# Patient Record
Sex: MALE | Race: WHITE | NOT HISPANIC OR LATINO | Employment: FULL TIME | URBAN - METROPOLITAN AREA
[De-identification: names, ages, dates, MRNs, and addresses within clinical notes are randomized per-mention and may not be internally consistent; named-entity substitution may affect disease eponyms.]

---

## 2019-05-11 ENCOUNTER — HOSPITAL ENCOUNTER (EMERGENCY)
Facility: MEDICAL CENTER | Age: 34
End: 2019-05-11
Attending: EMERGENCY MEDICINE
Payer: COMMERCIAL

## 2019-05-11 VITALS
DIASTOLIC BLOOD PRESSURE: 83 MMHG | HEART RATE: 57 BPM | RESPIRATION RATE: 18 BRPM | TEMPERATURE: 98 F | OXYGEN SATURATION: 96 % | WEIGHT: 240.3 LBS | SYSTOLIC BLOOD PRESSURE: 137 MMHG | BODY MASS INDEX: 29.26 KG/M2 | HEIGHT: 76 IN
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
## 2019-05-11 PROCEDURE — A9270 NON-COVERED ITEM OR SERVICE: HCPCS

## 2019-05-11 PROCEDURE — 700102 HCHG RX REV CODE 250 W/ 637 OVERRIDE(OP)

## 2019-05-11 PROCEDURE — 700111 HCHG RX REV CODE 636 W/ 250 OVERRIDE (IP): Performed by: EMERGENCY MEDICINE

## 2019-05-11 PROCEDURE — 99284 EMERGENCY DEPT VISIT MOD MDM: CPT

## 2019-05-11 PROCEDURE — 90471 IMMUNIZATION ADMIN: CPT

## 2019-05-11 PROCEDURE — 90715 TDAP VACCINE 7 YRS/> IM: CPT | Performed by: EMERGENCY MEDICINE

## 2019-05-11 RX ORDER — AMOXICILLIN AND CLAVULANATE POTASSIUM 875; 125 MG/1; MG/1
1 TABLET, FILM COATED ORAL ONCE
Status: COMPLETED | OUTPATIENT
Start: 2019-05-11 | End: 2019-05-11

## 2019-05-11 RX ORDER — AMOXICILLIN AND CLAVULANATE POTASSIUM 875; 125 MG/1; MG/1
TABLET, FILM COATED ORAL
Status: COMPLETED
Start: 2019-05-11 | End: 2019-05-11

## 2019-05-11 RX ORDER — AMOXICILLIN AND CLAVULANATE POTASSIUM 875; 125 MG/1; MG/1
1 TABLET, FILM COATED ORAL 2 TIMES DAILY
Qty: 20 TAB | Refills: 0 | Status: SHIPPED | OUTPATIENT
Start: 2019-05-11 | End: 2019-05-21

## 2019-05-11 RX ADMIN — AMOXICILLIN AND CLAVULANATE POTASSIUM 1 TABLET: 875; 125 TABLET, FILM COATED ORAL at 10:52

## 2019-05-11 RX ADMIN — CLOSTRIDIUM TETANI TOXOID ANTIGEN (FORMALDEHYDE INACTIVATED), CORYNEBACTERIUM DIPHTHERIAE TOXOID ANTIGEN (FORMALDEHYDE INACTIVATED), BORDETELLA PERTUSSIS TOXOID ANTIGEN (GLUTARALDEHYDE INACTIVATED), BORDETELLA PERTUSSIS FILAMENTOUS HEMAGGLUTININ ANTIGEN (FORMALDEHYDE INACTIVATED), BORDETELLA PERTUSSIS PERTACTIN ANTIGEN, AND BORDETELLA PERTUSSIS FIMBRIAE 2/3 ANTIGEN 0.5 ML: 5; 2; 2.5; 5; 3; 5 INJECTION, SUSPENSION INTRAMUSCULAR at 10:52

## 2019-05-11 ASSESSMENT — PAIN DESCRIPTION - DESCRIPTORS: DESCRIPTORS: ACHING

## 2019-05-11 NOTE — ED NOTES
"Pt presents accompanied by family.  He C/O a left wrist injury caused by a dog bite, last night.  The affected area is bandaged, and therefore the extent of injury is not known.   Chief Complaint   Patient presents with   • Dog Bite     /83   Pulse 76   Temp 36.7 °C (98 °F) (Temporal)   Resp 18   Ht 1.93 m (6' 4\")   Wt 109 kg (240 lb 4.8 oz)   SpO2 98%   BMI 29.25 kg/m²     "

## 2019-05-11 NOTE — ED PROVIDER NOTES
"ED Provider Note    CHIEF COMPLAINT  Chief Complaint   Patient presents with   • Dog Bite       HPI  Devin Ramesh is a 33 y.o. male who presents with a dog bite to his left wrist.  He is watching his mother-in-law's dog and went to take him for a walk last night he tried to put the leash and the dog bit his wrist.  As far as he knows the dog is up-to-date with his shots.  The patient's last tetanus was approximately 8 years ago.  He denies any fevers.  He washed the area but rubbing alcohol and Neosporin on the wounds.  When he woke up this morning he noticed a few of them are red and came in for evaluation.  He is able to move his wrist without difficulty.      REVIEW OF SYSTEMS  Positive for dog bites, negative fever.     PAST MEDICAL HISTORY       SOCIAL HISTORY  Social History     Social History Main Topics   • Smoking status: Never Smoker   • Smokeless tobacco: Never Used   • Alcohol use Yes      Comment: Occasionally   • Drug use: No   • Sexual activity: Not on file       SURGICAL HISTORY  patient denies any surgical history    CURRENT MEDICATIONS  Home Medications     Reviewed by Rodrick Ramsey (Pharmacy Tech) on 05/11/19 at 1035  Med List Status: Complete   Medication Last Dose Status        Patient Toro Taking any Medications                       ALLERGIES  No Known Allergies    PHYSICAL EXAM  VITAL SIGNS: /83   Pulse 76   Temp 36.7 °C (98 °F) (Temporal)   Resp 18   Ht 1.93 m (6' 4\")   Wt 109 kg (240 lb 4.8 oz)   SpO2 98%   BMI 29.25 kg/m²   Constitutional: Alert in no apparent distress. Well apearing  HENT: Normocephalic, Atraumatic, Bilateral external ears normal. Nose normal.   Eyes:  Conjunctiva normal, non-icteric.   Lungs: Non-labored respirations  Skin: Warm, Dry, No erythema, No rash.   Neurologic: Alert, Grossly non-focal.   Psychiatric: Affect normal, Judgment normal, Mood normal, Appears appropriate and not intoxicated.   Extremities: Left wrist with multiple " superficial lacerations no active bleeding a few the lacerations over the ventral aspect of the wrist have some slight erythema surrounding them.      DIAGNOSTIC STUDIES / PROCEDURES      COURSE & MEDICAL DECISION MAKING  Pertinent Labs & Imaging studies reviewed. (See chart for details)  This is a 33-year-old that presents with multiple dog bites to his left wrist.  He has full range of motion of his left wrist he will be placed on Augmentin and his tetanus will be updated.     The patient will return for new or worsening symptoms and is stable at the time of discharge. Patient was given return precautions. Patient and/or family member verbalizes understanding and will comply.    DISPOSITION:  Patient will be discharged home in stable condition.    FOLLOW UP:  Primary Care      Please follow-up your blood pressure was elevated    [facility], Emergency Dept  [address]  [place] [address] [phone]    Return for worsening pain, redness, swelling, fevers or other concerns        OUTPATIENT MEDICATIONS:  New Prescriptions    AMOXICILLIN-CLAVULANATE (AUGMENTIN) 875-125 MG TAB    Take 1 Tab by mouth 2 times a day for 10 days.       Your blood pressure is elevated here in the emergency department. Please monitor your blood pressure over the next several days. If your blood pressure continues to be 120/80 or higher please contact your physician for blood pressure management.       FINAL IMPRESSION  1. Dog bite, initial encounter        2.   3.     This dictation has been creating using voice recognition software. The accuracy of the dictation is limited the abilities of the software.  I expect there may be some errors of grammar and possibly content. I made every attempt to manually correct the errors within my dictation. However errors related to this voice recognition software may still exist and should be interpreted within the appropriate context.        The note accurately  reflects work and decisions made by me.  Ofelia Ward  5/11/2019  10:45 AM

## 2023-07-24 ENCOUNTER — APPOINTMENT (OUTPATIENT)
Dept: RADIOLOGY | Facility: MEDICAL CENTER | Age: 38
End: 2023-07-24
Attending: EMERGENCY MEDICINE
Payer: COMMERCIAL

## 2023-07-24 ENCOUNTER — HOSPITAL ENCOUNTER (EMERGENCY)
Facility: MEDICAL CENTER | Age: 38
End: 2023-07-24
Attending: EMERGENCY MEDICINE
Payer: COMMERCIAL

## 2023-07-24 VITALS
HEART RATE: 80 BPM | TEMPERATURE: 97.4 F | SYSTOLIC BLOOD PRESSURE: 107 MMHG | OXYGEN SATURATION: 94 % | DIASTOLIC BLOOD PRESSURE: 66 MMHG | RESPIRATION RATE: 15 BRPM | WEIGHT: 231.48 LBS | BODY MASS INDEX: 28.18 KG/M2

## 2023-07-24 DIAGNOSIS — N20.0 KIDNEY STONE ON LEFT SIDE: ICD-10-CM

## 2023-07-24 DIAGNOSIS — R19.00 INTRAABDOMINAL MASS: ICD-10-CM

## 2023-07-24 LAB
ALBUMIN SERPL BCP-MCNC: 4.2 G/DL (ref 3.2–4.9)
ALBUMIN/GLOB SERPL: 1 G/DL
ALP SERPL-CCNC: 90 U/L (ref 30–99)
ALT SERPL-CCNC: 29 U/L (ref 2–50)
ANION GAP SERPL CALC-SCNC: 17 MMOL/L (ref 7–16)
APPEARANCE UR: CLEAR
AST SERPL-CCNC: 22 U/L (ref 12–45)
BACTERIA #/AREA URNS HPF: NEGATIVE /HPF
BASOPHILS # BLD AUTO: 0 % (ref 0–1.8)
BASOPHILS # BLD: 0 K/UL (ref 0–0.12)
BILIRUB SERPL-MCNC: 0.5 MG/DL (ref 0.1–1.5)
BILIRUB UR QL STRIP.AUTO: NEGATIVE
BUN SERPL-MCNC: 25 MG/DL (ref 8–22)
CALCIUM ALBUM COR SERPL-MCNC: 9.8 MG/DL (ref 8.5–10.5)
CALCIUM SERPL-MCNC: 10 MG/DL (ref 8.4–10.2)
CHLORIDE SERPL-SCNC: 102 MMOL/L (ref 96–112)
CO2 SERPL-SCNC: 21 MMOL/L (ref 20–33)
COLOR UR: YELLOW
CREAT SERPL-MCNC: 1.53 MG/DL (ref 0.5–1.4)
EOSINOPHIL # BLD AUTO: 0.28 K/UL (ref 0–0.51)
EOSINOPHIL NFR BLD: 2 % (ref 0–6.9)
EPI CELLS #/AREA URNS HPF: ABNORMAL /HPF
ERYTHROCYTE [DISTWIDTH] IN BLOOD BY AUTOMATED COUNT: 35.4 FL (ref 35.9–50)
GFR SERPLBLD CREATININE-BSD FMLA CKD-EPI: 59 ML/MIN/1.73 M 2
GLOBULIN SER CALC-MCNC: 4.2 G/DL (ref 1.9–3.5)
GLUCOSE SERPL-MCNC: 160 MG/DL (ref 65–99)
GLUCOSE UR STRIP.AUTO-MCNC: NEGATIVE MG/DL
HCT VFR BLD AUTO: 45.6 % (ref 42–52)
HGB BLD-MCNC: 15.7 G/DL (ref 14–18)
KETONES UR STRIP.AUTO-MCNC: >=80 MG/DL
LEUKOCYTE ESTERASE UR QL STRIP.AUTO: NEGATIVE
LIPASE SERPL-CCNC: 15 U/L (ref 11–82)
LYMPHOCYTES # BLD AUTO: 0.97 K/UL (ref 1–4.8)
LYMPHOCYTES NFR BLD: 7 % (ref 22–41)
MANUAL DIFF BLD: NORMAL
MCH RBC QN AUTO: 29.7 PG (ref 27–33)
MCHC RBC AUTO-ENTMCNC: 34.4 G/DL (ref 32.3–36.5)
MCV RBC AUTO: 86.4 FL (ref 81.4–97.8)
MICRO URNS: ABNORMAL
MONOCYTES # BLD AUTO: 0.83 K/UL (ref 0–0.85)
MONOCYTES NFR BLD AUTO: 6 % (ref 0–13.4)
MUCOUS THREADS #/AREA URNS HPF: ABNORMAL /HPF
NEUTROPHILS # BLD AUTO: 11.73 K/UL (ref 1.82–7.42)
NEUTROPHILS NFR BLD: 83 % (ref 44–72)
NEUTS BAND NFR BLD MANUAL: 2 % (ref 0–10)
NITRITE UR QL STRIP.AUTO: NEGATIVE
NRBC # BLD AUTO: 0 K/UL
NRBC BLD-RTO: 0 /100 WBC (ref 0–0.2)
PH UR STRIP.AUTO: 7 [PH] (ref 5–8)
PLATELET # BLD AUTO: 372 K/UL (ref 164–446)
PLATELET BLD QL SMEAR: NORMAL
PMV BLD AUTO: 9.7 FL (ref 9–12.9)
POTASSIUM SERPL-SCNC: 4.2 MMOL/L (ref 3.6–5.5)
PROT SERPL-MCNC: 8.4 G/DL (ref 6–8.2)
PROT UR QL STRIP: NEGATIVE MG/DL
RBC # BLD AUTO: 5.28 M/UL (ref 4.7–6.1)
RBC # URNS HPF: ABNORMAL /HPF
RBC BLD AUTO: NORMAL
RBC UR QL AUTO: ABNORMAL
SODIUM SERPL-SCNC: 140 MMOL/L (ref 135–145)
SP GR UR STRIP.AUTO: 1.02
WBC # BLD AUTO: 13.8 K/UL (ref 4.8–10.8)
WBC #/AREA URNS HPF: ABNORMAL /HPF

## 2023-07-24 PROCEDURE — 96375 TX/PRO/DX INJ NEW DRUG ADDON: CPT

## 2023-07-24 PROCEDURE — 96376 TX/PRO/DX INJ SAME DRUG ADON: CPT

## 2023-07-24 PROCEDURE — A9270 NON-COVERED ITEM OR SERVICE: HCPCS | Performed by: EMERGENCY MEDICINE

## 2023-07-24 PROCEDURE — 85025 COMPLETE CBC W/AUTO DIFF WBC: CPT

## 2023-07-24 PROCEDURE — 99284 EMERGENCY DEPT VISIT MOD MDM: CPT

## 2023-07-24 PROCEDURE — 81001 URINALYSIS AUTO W/SCOPE: CPT

## 2023-07-24 PROCEDURE — 700102 HCHG RX REV CODE 250 W/ 637 OVERRIDE(OP): Performed by: EMERGENCY MEDICINE

## 2023-07-24 PROCEDURE — 83690 ASSAY OF LIPASE: CPT

## 2023-07-24 PROCEDURE — 80053 COMPREHEN METABOLIC PANEL: CPT

## 2023-07-24 PROCEDURE — 74176 CT ABD & PELVIS W/O CONTRAST: CPT

## 2023-07-24 PROCEDURE — 36415 COLL VENOUS BLD VENIPUNCTURE: CPT

## 2023-07-24 PROCEDURE — 85007 BL SMEAR W/DIFF WBC COUNT: CPT

## 2023-07-24 PROCEDURE — 96374 THER/PROPH/DIAG INJ IV PUSH: CPT

## 2023-07-24 PROCEDURE — 700105 HCHG RX REV CODE 258: Mod: JZ | Performed by: EMERGENCY MEDICINE

## 2023-07-24 PROCEDURE — 700111 HCHG RX REV CODE 636 W/ 250 OVERRIDE (IP): Performed by: EMERGENCY MEDICINE

## 2023-07-24 RX ORDER — ONDANSETRON 2 MG/ML
4 INJECTION INTRAMUSCULAR; INTRAVENOUS ONCE
Status: COMPLETED | OUTPATIENT
Start: 2023-07-24 | End: 2023-07-24

## 2023-07-24 RX ORDER — HYDROMORPHONE HYDROCHLORIDE 1 MG/ML
0.5 INJECTION, SOLUTION INTRAMUSCULAR; INTRAVENOUS; SUBCUTANEOUS ONCE
Status: COMPLETED | OUTPATIENT
Start: 2023-07-24 | End: 2023-07-24

## 2023-07-24 RX ORDER — ONDANSETRON 4 MG/1
4 TABLET, ORALLY DISINTEGRATING ORAL EVERY 6 HOURS PRN
Qty: 10 TABLET | Refills: 0 | Status: SHIPPED | OUTPATIENT
Start: 2023-07-24 | End: 2023-07-31

## 2023-07-24 RX ORDER — TAMSULOSIN HYDROCHLORIDE 0.4 MG/1
0.4 CAPSULE ORAL ONCE
Status: COMPLETED | OUTPATIENT
Start: 2023-07-24 | End: 2023-07-24

## 2023-07-24 RX ORDER — HYDROMORPHONE HYDROCHLORIDE 1 MG/ML
1 INJECTION, SOLUTION INTRAMUSCULAR; INTRAVENOUS; SUBCUTANEOUS ONCE
Status: COMPLETED | OUTPATIENT
Start: 2023-07-24 | End: 2023-07-24

## 2023-07-24 RX ORDER — SODIUM CHLORIDE, SODIUM LACTATE, POTASSIUM CHLORIDE, CALCIUM CHLORIDE 600; 310; 30; 20 MG/100ML; MG/100ML; MG/100ML; MG/100ML
1000 INJECTION, SOLUTION INTRAVENOUS ONCE
Status: COMPLETED | OUTPATIENT
Start: 2023-07-24 | End: 2023-07-24

## 2023-07-24 RX ORDER — OXYCODONE HYDROCHLORIDE AND ACETAMINOPHEN 5; 325 MG/1; MG/1
1-2 TABLET ORAL EVERY 4 HOURS PRN
Qty: 10 TABLET | Refills: 0 | Status: SHIPPED | OUTPATIENT
Start: 2023-07-24 | End: 2023-07-27

## 2023-07-24 RX ORDER — TAMSULOSIN HYDROCHLORIDE 0.4 MG/1
0.4 CAPSULE ORAL DAILY
Qty: 30 CAPSULE | Refills: 0 | Status: SHIPPED | OUTPATIENT
Start: 2023-07-24

## 2023-07-24 RX ADMIN — SODIUM CHLORIDE, POTASSIUM CHLORIDE, SODIUM LACTATE AND CALCIUM CHLORIDE 1000 ML: 600; 310; 30; 20 INJECTION, SOLUTION INTRAVENOUS at 07:02

## 2023-07-24 RX ADMIN — HYDROMORPHONE HYDROCHLORIDE 1 MG: 1 INJECTION, SOLUTION INTRAMUSCULAR; INTRAVENOUS; SUBCUTANEOUS at 07:02

## 2023-07-24 RX ADMIN — TAMSULOSIN HYDROCHLORIDE 0.4 MG: 0.4 CAPSULE ORAL at 08:45

## 2023-07-24 RX ADMIN — ONDANSETRON 4 MG: 2 INJECTION INTRAMUSCULAR; INTRAVENOUS at 07:02

## 2023-07-24 RX ADMIN — HYDROMORPHONE HYDROCHLORIDE 0.5 MG: 1 INJECTION, SOLUTION INTRAMUSCULAR; INTRAVENOUS; SUBCUTANEOUS at 08:45

## 2023-07-24 ASSESSMENT — LIFESTYLE VARIABLES
EVER FELT BAD OR GUILTY ABOUT YOUR DRINKING: NO
DO YOU DRINK ALCOHOL: NO
CONSUMPTION TOTAL: INCOMPLETE
EVER HAD A DRINK FIRST THING IN THE MORNING TO STEADY YOUR NERVES TO GET RID OF A HANGOVER: NO
TOTAL SCORE: 0
TOTAL SCORE: 0
HAVE PEOPLE ANNOYED YOU BY CRITICIZING YOUR DRINKING: NO
HAVE YOU EVER FELT YOU SHOULD CUT DOWN ON YOUR DRINKING: NO
TOTAL SCORE: 0

## 2023-07-24 NOTE — DISCHARGE INSTRUCTIONS
7/24/2023 7:40 AM     HISTORY/REASON FOR EXAM:  Vomiting.        TECHNIQUE/EXAM DESCRIPTION AND NUMBER OF VIEWS: CT scan renal/colic without contrast.     5 mm helical images of the abdomen and pelvis were obtained from the diaphragmatic domes through the pubic symphysis.     Low dose optimization technique was utilized for this CT exam including automated exposure control and adjustment of the mA and/or kV according to patient size.     COMPARISON:     FINDINGS:     Abdomen:  There is no evidence of focal consolidation or pleural effusion within the lung bases.  There is fatty change of the liver.  No large masses within the upper abdomen.  There is moderate left-sided hydronephrosis. There is a left ureterovesical junction stone present measuring 4 mm in size. There is left renal edema and perinephric fat stranding. There is a partially duplicated collecting system on the left. There is a   punctate 2 mm right lower pole renal calyceal stone.     Pelvis:  There is a spiculated mass within the left lower quadrant within the mesentery measuring 4.2 x 3.2 cm in size. This is located to the left of midline adjacent to the sigmoid colon. There is sigmoid diverticulosis.  No evidence of bowel obstruction.  There is no evidence of free fluid. There are small nonenlarged mesenteric lymph nodes adjacent to the spiculated mass as well some stranding attenuation within the mesentery..     IMPRESSION:     1.  4 mm left ureterovesical junction stone with moderate hydronephrosis seen above that level. 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     2.  Irregular spiculated mass within the mesentery within the left lower quadrant adjacent to the sigmoid colon. This measures 4.2 x 3.2 cm in size. There is some adjacent mesenteric lymph nodes as well as irregular stranding density within the   mesentery. Consideration should be given for neoplastic processes to include carcinoid tumor as well as colon cancer  and other neoplastic processes.     3.  Sigmoid diverticulosis     4.  Fatty liver.     5.  2 mm right lower pole renal calyceal stone.           Exam Ended: 07/24/23 07:52

## 2023-07-24 NOTE — ED PROVIDER NOTES
ED Provider Note    CHIEF COMPLAINT  Chief Complaint   Patient presents with    Vomiting     Reports vomiting for 12 hrs, reports being around other sick people last week       EXTERNAL RECORDS REVIEWED  None available    HPI/ROS  LIMITATION TO HISTORY   No limitation  OUTSIDE HISTORIAN(S):  None    Devin Ramesh is a 37 y.o. male who presents for evaluation of abrupt onset left lower quadrant discomfort radiating up to the back severe nausea and vomiting without diarrhea.  The patient is an otherwise healthy 37-year-old gentleman.  He has no significant medical or surgical history.  He cannot recall any trauma.  He reports 10 out of 10 pain with associated nausea but no vomiting or diarrhea.  No pain in the testicles.  No urinary symptoms such as dysuria or hematuria.  Patient does not think he is ever passed a kidney stone.  No report of any chest pain fevers chills night sweats or weight loss    PAST MEDICAL HISTORY   No significant medical history    SURGICAL HISTORY  patient denies any surgical history  No thoracoabdominal surgical history  FAMILY HISTORY  No family history on file.  None reported  SOCIAL HISTORY  Social History     Tobacco Use    Smoking status: Never    Smokeless tobacco: Never   Substance and Sexual Activity    Alcohol use: Yes     Comment: Occasionally    Drug use: No    Sexual activity: Not on file     No drug or alcohol abuse  CURRENT MEDICATIONS  Home Medications       Reviewed by Rodrick Victor (Pharmacy Tech) on 07/24/23 at 0814  Med List Status: Complete     Medication Last Dose Status   Glucos-Chondroit-Hyaluron-MSM (GLUCOSAMINE CHONDROITIN JOINT PO) 7/22/2023 Active   multivitamin Tab 7/22/2023 Active   NAPROXEN PO 7/22/2023 Active                No regular meds    ALLERGIES  Allergies   Allergen Reactions    Belle Diarrhea and Vomiting     .       PHYSICAL EXAM  VITAL SIGNS: BP (!) 162/84   Pulse 71   Temp 36.8 °C (98.2 °F) (Temporal)   Resp 16   Wt 105 kg  (231 lb 7.7 oz)   SpO2 95%   BMI 28.18 kg/m²    Pulse ox interpretation: I interpret this pulse ox as normal.  Constitutional: Alert and oriented x 3,  severe distress  HEENT: Atraumatic normocephalic, pupils are equal round reactive to light extraocular movements are intact. The nares is clear, external ears are normal, mouth shows moist mucous membranes normal dentition for age  Neck: Supple, no JVD no tracheal deviation  Cardiovascular: Regular rate and rhythm no murmur rub or gallop 2+ pulses peripherally x4  Thorax & Lungs: No respiratory distress, no wheezes rales or rhonchi, No chest tenderness.   GI: Reproducible left lower quadrant pain, left flank discomfort noted.  No palpable hernia or mass no rebound guarding or rigidity.    Skin: Warm dry no acute rash or lesion  Musculoskeletal: Moving all extremities with full range and 5 of 5 strength no acute  deformity  Neurologic: Cranial nerves III through XII are grossly intact no sensory deficit no cerebellar dysfunction   Psychiatric: Anxious          DIAGNOSTIC STUDIES / PROCEDURES      LABS  Results for orders placed or performed during the hospital encounter of 07/24/23   URINALYSIS    Specimen: Urine   Result Value Ref Range    Color Yellow     Character Clear     Specific Gravity 1.020 <1.035    Ph 7.0 5.0 - 8.0    Glucose Negative Negative mg/dL    Ketones >=80 (A) Negative mg/dL    Protein Negative Negative mg/dL    Bilirubin Negative Negative    Nitrite Negative Negative    Leukocyte Esterase Negative Negative    Occult Blood Small (A) Negative    Micro Urine Req Microscopic    CBC WITH DIFFERENTIAL   Result Value Ref Range    WBC 13.8 (H) 4.8 - 10.8 K/uL    RBC 5.28 4.70 - 6.10 M/uL    Hemoglobin 15.7 14.0 - 18.0 g/dL    Hematocrit 45.6 42.0 - 52.0 %    MCV 86.4 81.4 - 97.8 fL    MCH 29.7 27.0 - 33.0 pg    MCHC 34.4 32.3 - 36.5 g/dL    RDW 35.4 (L) 35.9 - 50.0 fL    Platelet Count 372 164 - 446 K/uL    MPV 9.7 9.0 - 12.9 fL    Neutrophils-Polys  83.00 (H) 44.00 - 72.00 %    Lymphocytes 7.00 (L) 22.00 - 41.00 %    Monocytes 6.00 0.00 - 13.40 %    Eosinophils 2.00 0.00 - 6.90 %    Basophils 0.00 0.00 - 1.80 %    Nucleated RBC 0.00 0.00 - 0.20 /100 WBC    Neutrophils (Absolute) 11.73 (H) 1.82 - 7.42 K/uL    Lymphs (Absolute) 0.97 (L) 1.00 - 4.80 K/uL    Monos (Absolute) 0.83 0.00 - 0.85 K/uL    Eos (Absolute) 0.28 0.00 - 0.51 K/uL    Baso (Absolute) 0.00 0.00 - 0.12 K/uL    NRBC (Absolute) 0.00 K/uL   Comp Metabolic Panel   Result Value Ref Range    Sodium 140 135 - 145 mmol/L    Potassium 4.2 3.6 - 5.5 mmol/L    Chloride 102 96 - 112 mmol/L    Co2 21 20 - 33 mmol/L    Anion Gap 17.0 (H) 7.0 - 16.0    Glucose 160 (H) 65 - 99 mg/dL    Bun 25 (H) 8 - 22 mg/dL    Creatinine 1.53 (H) 0.50 - 1.40 mg/dL    Calcium 10.0 8.4 - 10.2 mg/dL    Correct Calcium 9.8 8.5 - 10.5 mg/dL    AST(SGOT) 22 12 - 45 U/L    ALT(SGPT) 29 2 - 50 U/L    Alkaline Phosphatase 90 30 - 99 U/L    Total Bilirubin 0.5 0.1 - 1.5 mg/dL    Albumin 4.2 3.2 - 4.9 g/dL    Total Protein 8.4 (H) 6.0 - 8.2 g/dL    Globulin 4.2 (H) 1.9 - 3.5 g/dL    A-G Ratio 1.0 g/dL   LIPASE   Result Value Ref Range    Lipase 15 11 - 82 U/L   URINE MICROSCOPIC (W/UA)   Result Value Ref Range    WBC Rare (A) /hpf    RBC 10-20 (A) /hpf    Bacteria Negative None /hpf    Epithelial Cells Few Few /hpf    Mucous Threads Few /hpf   ESTIMATED GFR   Result Value Ref Range    GFR (CKD-EPI) 59 (A) >60 mL/min/1.73 m 2   DIFFERENTIAL MANUAL   Result Value Ref Range    Bands-Stabs 2.00 0.00 - 10.00 %    Manual Diff Status PERFORMED    PLATELET ESTIMATE   Result Value Ref Range    Plt Estimation Normal    MORPHOLOGY   Result Value Ref Range    RBC Morphology Normal         RADIOLOGY  I have independently interpreted the diagnostic imaging associated with this visit and am waiting the final reading from the radiologist.   My preliminary interpretation is as follows:   Radiologist interpretation: 4 mm UVJ stone with hydronephrosis.   Mass noted in the left lower quadrant  CT-RENAL COLIC EVALUATION(A/P W/O)   Final Result      1.  4 mm left ureterovesical junction stone with moderate hydronephrosis seen above that level. 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      2.  Irregular spiculated mass within the mesentery within the left lower quadrant adjacent to the sigmoid colon. This measures 4.2 x 3.2 cm in size. There is some adjacent mesenteric lymph nodes as well as irregular stranding density within the    mesentery. Consideration should be given for neoplastic processes to include carcinoid tumor as well as colon cancer and other neoplastic processes.      3.  Sigmoid diverticulosis      4.  Fatty liver.      5.  2 mm right lower pole renal calyceal stone.          COURSE & MEDICAL DECISION MAKING    ED Observation Status? Yes; I am placing the patient in to an observation status due to a diagnostic uncertainty as well as therapeutic intensity. Patient placed in observation status at 6:52 AM, 7/24/2023.     Observation plan is as follows: Establish an IV, administer parenteral nausea pain medication and IV fluids.  Perform extensive testing including urinalysis CBC metabolic panel.  Perform imaging studies.  Perform serial abdominal exams    Upon Reevaluation, the patient's condition has: Improved; and will be discharged.    Patient discharged from ED Observation status at 1010 (Time) 7/24 (Date).     INITIAL ASSESSMENT, COURSE AND PLAN  Care Narrative:     This is a very pleasant 37-year-old gentleman presents here with abrupt onset left lower quadrant abdominal discomfort.  Differential diagnosis was extensive but nonocclusive due to kidney stone with or without infection possible ovarian torsion diverticulitis with or without perforation intra-abdominal mass gastroenteritis splenomegaly.  The patient had extensive evaluation.  An IV was established.  He was given parenteral nausea and pain medication.  He was  observed for several hours and perform serial abdominal exams.  Here his vital signs are reassuring.  He has metabolic panel demonstrates slight elevation of glucose no tachycardia or hypotension or fever.  He has mild leukocytosis likely stress response.  And some mild renal insufficiency with an elevated BUN at 25 and creatinine 1.53.  We do not have baseline levels for the patient.  Urinalysis suggests small hematuria rather than infection.  CT scan was performed and does clearly demonstrate a 4 mm UVJ kidney stone.  There is some mild hydronephrosis.  Perhaps of equal or greater concern is that there does appear to be a spiculated mass in the left mid quadrant that has concerning features for possible neoplasm.  I doubt this is the etiology of the patient's pain as he very clearly has renal colic type symptomatology and an obvious stone in the left UVJ.  I had a long discussion with the patient and his mother-in-law.  He is an otherwise healthy 37-year-old who does not currently have a PCP does not have any known history of cancer and has never had a colonoscopy.  I did review the case briefly with the nurse practitioner working with Dr. Rosa with gastroenterology.  She has the patient's name and medical record number.  I will provide a urgent outpatient referral as he will clearly require additional evaluation for this in an expedited fashion.  He will also require a new referral to a PCP as well as referral to urology.  Patient will be given a small prescription of nausea and pain medication and Flomax and new referrals for PCP as well as specialist follow-up      HYDRATION: Based on the patient's presentation of Acute Vomiting the patient was given IV fluids. IV Hydration was used because oral hydration was not adequate alone. Upon recheck following hydration, the patient was improved.      ADDITIONAL PROBLEM LIST    DISPOSITION AND DISCUSSIONS  I have discussed management of the patient with the following  physicians and SAVANNA's: Discussed with gastroenterology nurse practitioner    Discussion of management with other QHP or appropriate source(s): None    Escalation of care considered, and ultimately not performed:acute inpatient care management, however at this time, the patient is most appropriate for outpatient management    Barriers to care at this time, including but not limited to: Patient does not have established PCP.     Decision tools and prescription drugs considered including, but not limited to: Pain Medications   .    FINAL DIAGNOSIS  1. Kidney stone on left side  Referral to Urology      2. Intraabdominal mass  Referral to Gastroenterology        In prescribing controlled substances to this patient, I certify that I have obtained and reviewed the medical history of Devin Ramesh. I have also made a good jacqui effort to obtain applicable records from other providers who have treated the patient and records did not demonstrate any increased risk of substance abuse that would prevent me from prescribing controlled substances.     I have conducted a physical exam and documented it. I have reviewed Mr. Ramesh’s prescription history as maintained by the [place] Prescription Monitoring Program.     I have assessed the patient’s risk for abuse, dependency, and addiction using the validated Opioid Risk Tool available at https://www.mdcalc.com/jdmzqj-pajk-ltyl-ort-narcotic-abuse.     Given the above, I believe the benefits of controlled substance therapy outweigh the risks. The reasons for prescribing controlled substances include non-narcotic, oral analgesic alternatives have been inadequate for pain control. Accordingly, I have discussed the risk and benefits, treatment plan, and alternative therapies with the patient.          Electronically signed by: Jackson Johnson M.D., 7/24/2023 6:51 AM

## 2023-07-24 NOTE — ED NOTES
Discharge instructions given and discussed. RX sent to preferred pharmacy and pt educated. Pt educated to come back to ER for new or worsening symptoms and follow up with PCP, urology, and GI, as instructed. Pt verbalized understanding. VSS. Pt  Discharged in stable condition.

## 2023-07-24 NOTE — DISCHARGE PLANNING
Anticipated Discharge Disposition: Home    Action: Call from Pharmacy to  from [employer] -  [phone]  for Max per day of 6 on RX . Total qs 10. Reviewed w ER MD and rX and provided so rx can be filled and provided to pt today for pain control. Pt waiting    Barriers to Discharge: None    Plan: No further needs

## 2023-07-24 NOTE — ED NOTES
Med rec updated and complete, per pt   Allergies reviewed, per pt  Interviewed pt with mother in law at bedside with permission from pt.  Pt reports no prescription medications in the last 30 days or longer.  Pt reports no antibiotics in the last 30 days.

## 2023-07-31 ENCOUNTER — OFFICE VISIT (OUTPATIENT)
Dept: MEDICAL GROUP | Facility: MEDICAL CENTER | Age: 38
End: 2023-07-31
Payer: COMMERCIAL

## 2023-07-31 VITALS
HEART RATE: 78 BPM | BODY MASS INDEX: 28.59 KG/M2 | WEIGHT: 234.79 LBS | DIASTOLIC BLOOD PRESSURE: 68 MMHG | TEMPERATURE: 97.4 F | SYSTOLIC BLOOD PRESSURE: 122 MMHG | OXYGEN SATURATION: 96 % | HEIGHT: 76 IN

## 2023-07-31 DIAGNOSIS — Z80.0 FAMILY HISTORY OF COLON CANCER: ICD-10-CM

## 2023-07-31 DIAGNOSIS — N20.0 NEPHROLITHIASIS: ICD-10-CM

## 2023-07-31 DIAGNOSIS — Z13.220 ENCOUNTER FOR LIPID SCREENING FOR CARDIOVASCULAR DISEASE: ICD-10-CM

## 2023-07-31 DIAGNOSIS — Z13.6 ENCOUNTER FOR LIPID SCREENING FOR CARDIOVASCULAR DISEASE: ICD-10-CM

## 2023-07-31 DIAGNOSIS — Z11.59 ENCOUNTER FOR HEPATITIS C SCREENING TEST FOR LOW RISK PATIENT: ICD-10-CM

## 2023-07-31 DIAGNOSIS — Z13.0 SCREENING FOR DEFICIENCY ANEMIA: ICD-10-CM

## 2023-07-31 DIAGNOSIS — R73.09 ELEVATED GLUCOSE LEVEL: ICD-10-CM

## 2023-07-31 DIAGNOSIS — R19.04 LEFT LOWER QUADRANT ABDOMINAL MASS: ICD-10-CM

## 2023-07-31 PROCEDURE — 99204 OFFICE O/P NEW MOD 45 MIN: CPT | Performed by: FAMILY MEDICINE

## 2023-07-31 PROCEDURE — 3074F SYST BP LT 130 MM HG: CPT | Performed by: FAMILY MEDICINE

## 2023-07-31 PROCEDURE — 3078F DIAST BP <80 MM HG: CPT | Performed by: FAMILY MEDICINE

## 2023-07-31 ASSESSMENT — PATIENT HEALTH QUESTIONNAIRE - PHQ9: CLINICAL INTERPRETATION OF PHQ2 SCORE: 0

## 2023-07-31 ASSESSMENT — FIBROSIS 4 INDEX: FIB4 SCORE: 0.44

## 2023-07-31 NOTE — ASSESSMENT & PLAN NOTE
Unknown diagnosis with uncertain prognosis.  Reviewed ED note, labs, and imaging with the patient.  Discussed that Dr. Rosa's NP has been notified of his results and they are expecting him.  Recommended to go down to [facility] in person to schedule his appointment.  We will contact his office personally to confirm that the patient does have a sooner appointment.  Recommended, due to family history that we test him for Carias syndrome due to a strong family history on father side for abdominal cancers.  [facility] referral placed.  If the patient is unable to get an appointment with gastroenterology, consider referral to [facility] and a secondary urgent referral to [facility]

## 2023-07-31 NOTE — PATIENT INSTRUCTIONS
[facility]  [id number]  [place] [address]  [phone]    Dr. Rosa with gastroenterology nurse practioner was notified about the CT results

## 2023-07-31 NOTE — PROGRESS NOTES
Devin Ramesh is a pleasant 38 y.o. male here to establish care.    HPI:   Problem   Left Lower Quadrant Abdominal Mass    Seen in the ED on 07/24/2023 and found to have an abdominal mass, advised to be seen by GI for an urgent referral.  Seen in ED for symptoms consistent for Kidney stones which he did come back positive for.  This was noted as an incidental finding.  Attempted to contact [facility] on multiple occasions, never got a call back from them to schedule.     CT notes: Irregular spiculated mass within the mesentery within the left lower quadrant adjacent to the sigmoid colon. This measures 4.2 x 3.2 cm in size. There is some adjacent mesenteric lymph nodes as well as irregular stranding density within the   mesentery. Consideration should be given for neoplastic processes to include carcinoid tumor as well as colon cancer and other neoplastic processes.     Nephrolithiasis    Recent visit into the ED on 07/24/2023, positive for 2 kidney stones.  Followed up with Urology and didn't follow up due to out of pocket expense.  Continues to take tamsulosin 0.4 mg daily.  Completed labs in the emergency room which showed an increase in his BUN and creatinine, decrease in his GFR.  Since that time has been drinking plenty of fluids.     Elevated Glucose Level          Current medicines (including changes today)  Current Outpatient Medications   Medication Sig Dispense Refill    coenzyme Q-10 30 MG capsule Take 60 mg by mouth every day.      multivitamin Tab Take 1 Tablet by mouth every evening.      Glucos-Chondroit-Hyaluron-MSM (GLUCOSAMINE CHONDROITIN JOINT PO) Take 2 Tablets by mouth every evening.      NAPROXEN PO Take 1 Tablet by mouth 2 times a day as needed (For pain).      tamsulosin (FLOMAX) 0.4 MG capsule Take 1 Capsule by mouth every day. 30 Capsule 0     No current facility-administered medications for this visit.       Past Medical/ Surgical History  He  has no past medical history on  "file.  He  has no past surgical history on file.    Social History  Social History     Tobacco Use    Smoking status: Never    Smokeless tobacco: Never   Vaping Use    Vaping Use: Never used   Substance Use Topics    Alcohol use: Yes     Comment: once a month    Drug use: No     Social History     Social History Narrative    Not on file        Family History  Family History   Problem Relation Age of Onset    Anxiety disorder Mother     Depression Mother     Cancer Father         abdominal    Diabetes Sister         pre    Diabetes Sister         pre    No Known Problems Sister     Diabetes Brother     Autism Brother     Arterial Aneurysm Brother      Family Status   Relation Name Status    Mo  Alive    Fa      Sis 1/2 maternal Alive    Sis 1/2 maternal Alive    Sis 1/2 maternal Alive    Bro 1/2 Maternal Alive    Bro 1/2 maternal Alive    Bro 1/2 paternal Alive          Objective:     /68 (BP Location: Left arm, Patient Position: Sitting, BP Cuff Size: Adult)   Pulse 78   Temp 36.3 °C (97.4 °F) (Temporal)   Ht 1.93 m (6' 4\")   Wt 107 kg (234 lb 12.6 oz)   SpO2 96%  Body mass index is 28.58 kg/m².    Physical Exam  Constitutional:       General: He is not in acute distress.     Appearance: He is obese.   HENT:      Head: Normocephalic and atraumatic.      Right Ear: Tympanic membrane and external ear normal.      Left Ear: Tympanic membrane and external ear normal.      Nose: No nasal deformity.      Mouth/Throat:      Lips: Pink.      Mouth: Mucous membranes are moist.      Pharynx: Oropharynx is clear. Uvula midline. No posterior oropharyngeal erythema.   Eyes:      General: Lids are normal.      Extraocular Movements: Extraocular movements intact.      Conjunctiva/sclera: Conjunctivae normal.      Pupils: Pupils are equal, round, and reactive to light.   Neck:      Trachea: Trachea normal.   Cardiovascular:      Rate and Rhythm: Normal rate and regular rhythm.      Heart sounds: Normal heart " sounds. No murmur heard.     No friction rub. No gallop.   Pulmonary:      Effort: Pulmonary effort is normal. No accessory muscle usage.      Breath sounds: Normal breath sounds. No wheezing or rales.   Abdominal:      General: Bowel sounds are normal.      Palpations: Abdomen is soft.      Tenderness: There is abdominal tenderness in the right lower quadrant and left lower quadrant.   Musculoskeletal:      Cervical back: Normal range of motion and neck supple.      Right lower leg: No edema.      Left lower leg: No edema.   Lymphadenopathy:      Cervical: No cervical adenopathy.   Skin:     General: Skin is warm and dry.      Findings: No rash.   Neurological:      General: No focal deficit present.      Mental Status: He is alert and oriented to person, place, and time. Mental status is at baseline.      GCS: GCS eye subscore is 4. GCS verbal subscore is 5. GCS motor subscore is 6.      Motor: No weakness.      Gait: Gait is intact.   Psychiatric:         Attention and Perception: Attention normal.         Mood and Affect: Mood and affect normal.         Speech: Speech normal.          Imagin2023 CT renal:   IMPRESSION:     1.  4 mm left ureterovesical junction stone with moderate hydronephrosis seen above that level. 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     2.  Irregular spiculated mass within the mesentery within the left lower quadrant adjacent to the sigmoid colon. This measures 4.2 x 3.2 cm in size. There is some adjacent mesenteric lymph nodes as well as irregular stranding density within the   mesentery. Consideration should be given for neoplastic processes to include carcinoid tumor as well as colon cancer and other neoplastic processes.     3.  Sigmoid diverticulosis     4.  Fatty liver.     5.  2 mm right lower pole renal calyceal stone.    Labs:  Most recent labs from 2023 reviewed with patient.  Assessment and Plan:   The following treatment plan was  discussed     Problem List Items Addressed This Visit       Left lower quadrant abdominal mass     Unknown diagnosis with uncertain prognosis.  Reviewed ED note, labs, and imaging with the patient.  Discussed that Dr. Rosa's NP has been notified of his results and they are expecting him.  Recommended to go down to [facility] in person to schedule his appointment.  We will contact his office personally to confirm that the patient does have a sooner appointment.  Recommended, due to family history that we test him for Carias syndrome due to a strong family history on father side for abdominal cancers.  [facility] referral placed.  If the patient is unable to get an appointment with gastroenterology, consider referral to [facility] and a secondary urgent referral to digestive health         Relevant Orders    Referral to Genetic Research Studies    Nephrolithiasis     Diagnosis for the patient.  Reviewed lab and imaging results with the patient.  Notified on the right he does have a small renal calculi that has not passed at this time.  Recommended that he drink lemon juice daily.  Continue with the tamsulosin 0.4 mg.         Elevated glucose level     New diagnosis for the patient.  Reviewed labs with the patient.  Suspect increase in glucose level secondary to his kidney stone, also expressed the need to keep an eye on his sugar levels and completed an A1c when he has labs done.         Relevant Orders    Comp Metabolic Panel    ESTIMATED GFR    HEMOGLOBIN A1C     Other Visit Diagnoses       Family history of colon cancer        Relevant Orders    Referral to Genetic Research Studies    Screening for deficiency anemia        Relevant Orders    CBC WITH DIFFERENTIAL    Encounter for lipid screening for cardiovascular disease        Relevant Orders    Lipid Profile    Encounter for hepatitis C screening test for low risk patient        Relevant Orders    HEP C VIRUS  ANTIBODY             Followup: Return in about 1 year (around 7/31/2024), or if symptoms worsen or fail to improve, for Annual.    Please note that this dictation was created using voice recognition software. I have made every reasonable attempt to correct obvious errors, but I expect that there are errors of grammar and possibly content that I did not discover before finalizing the note.

## 2023-07-31 NOTE — ASSESSMENT & PLAN NOTE
New diagnosis for the patient.  Reviewed labs with the patient.  Suspect increase in glucose level secondary to his kidney stone, also expressed the need to keep an eye on his sugar levels and completed an A1c when he has labs done.

## 2023-09-09 ENCOUNTER — HOSPITAL ENCOUNTER (EMERGENCY)
Facility: MEDICAL CENTER | Age: 38
End: 2023-09-09
Attending: STUDENT IN AN ORGANIZED HEALTH CARE EDUCATION/TRAINING PROGRAM
Payer: COMMERCIAL

## 2023-09-09 VITALS
SYSTOLIC BLOOD PRESSURE: 176 MMHG | TEMPERATURE: 98.2 F | WEIGHT: 242.51 LBS | DIASTOLIC BLOOD PRESSURE: 104 MMHG | OXYGEN SATURATION: 98 % | RESPIRATION RATE: 20 BRPM | HEIGHT: 76 IN | BODY MASS INDEX: 29.53 KG/M2 | HEART RATE: 72 BPM

## 2023-09-09 DIAGNOSIS — K08.89 PAIN, DENTAL: ICD-10-CM

## 2023-09-09 PROCEDURE — 64400 NJX AA&/STRD TRIGEMINAL NRV: CPT

## 2023-09-09 PROCEDURE — 700101 HCHG RX REV CODE 250: Performed by: STUDENT IN AN ORGANIZED HEALTH CARE EDUCATION/TRAINING PROGRAM

## 2023-09-09 PROCEDURE — 99282 EMERGENCY DEPT VISIT SF MDM: CPT

## 2023-09-09 RX ORDER — AMOXICILLIN AND CLAVULANATE POTASSIUM 875; 125 MG/1; MG/1
1 TABLET, FILM COATED ORAL 2 TIMES DAILY
Qty: 14 TABLET | Refills: 0 | Status: ACTIVE | OUTPATIENT
Start: 2023-09-09

## 2023-09-09 RX ORDER — IBUPROFEN 600 MG/1
600 TABLET ORAL EVERY 6 HOURS PRN
Qty: 30 TABLET | Refills: 0 | Status: SHIPPED | OUTPATIENT
Start: 2023-09-09

## 2023-09-09 RX ORDER — LIDOCAINE HYDROCHLORIDE AND EPINEPHRINE 10; 10 MG/ML; UG/ML
20 INJECTION, SOLUTION INFILTRATION; PERINEURAL ONCE
Status: COMPLETED | OUTPATIENT
Start: 2023-09-09 | End: 2023-09-09

## 2023-09-09 RX ORDER — ACETAMINOPHEN 500 MG
500-1000 TABLET ORAL EVERY 6 HOURS PRN
Qty: 30 TABLET | Refills: 0 | Status: SHIPPED | OUTPATIENT
Start: 2023-09-09

## 2023-09-09 RX ADMIN — LIDOCAINE HYDROCHLORIDE,EPINEPHRINE BITARTRATE 20 ML: 10; .01 INJECTION, SOLUTION INFILTRATION; PERINEURAL at 14:15

## 2023-09-09 ASSESSMENT — FIBROSIS 4 INDEX: FIB4 SCORE: 0.44

## 2023-09-09 NOTE — ED PROVIDER NOTES
CHIEF COMPLAINT  Chief Complaint   Patient presents with    Dental Pain     37 yo male ambulates to triage with reports of left lower dental pain after chipping tooth last Tuesday.  Has an appointment with his dentist on Monday.  Told to come to dentist to get antibiotics and pain control        LIMITATION TO HISTORY   Select: none    HPI    Devin Ramesh is a 38 y.o. male who presents to the Emergency Department evaluation of 10 days of left back molar pain.  Patient states he thinks he chipped a tooth approximately 10 days ago has had increasing pain in that tooth he made a dental appointment upcoming in 2 days though stated he has had progressing pain and left-sided cheek swelling.  No fevers no submandibular swelling no difficulty breathing or difficulty swallowing.    OUTSIDE HISTORIAN(S):  Select: Significant other pets patient has been taking Tylenol ibuprofen and oxycodone for pain    EXTERNAL RECORDS REVIEWED  Select: Other 7/24 CT renal colic study did show a 4 mm left ureterovesical junction stone      PAST MEDICAL HISTORY  Past Medical History:   Diagnosis Date    Kidney stone     Lumbar herniated disc     with paralysis due to pain     .    SURGICAL HISTORY  History reviewed. No pertinent surgical history.      FAMILY HISTORY  Family History   Problem Relation Age of Onset    Anxiety disorder Mother     Depression Mother     Cancer Father         abdominal    Diabetes Sister         pre    Diabetes Sister         pre    No Known Problems Sister     Diabetes Brother     Autism Brother     Arterial Aneurysm Brother           SOCIAL HISTORY  Social History     Socioeconomic History    Marital status:      Spouse name: Not on file    Number of children: Not on file    Years of education: Not on file    Highest education level: Not on file   Occupational History    Not on file   Tobacco Use    Smoking status: Never    Smokeless tobacco: Never   Vaping Use    Vaping Use: Never used  "  Substance and Sexual Activity    Alcohol use: Yes     Comment: once a month    Drug use: No    Sexual activity: Yes     Partners: Female     Comment:    Other Topics Concern    Not on file   Social History Narrative    Not on file     Social Determinants of Health     Financial Resource Strain: Not on file   Food Insecurity: Not on file   Transportation Needs: Not on file   Physical Activity: Not on file   Stress: Not on file   Social Connections: Not on file   Intimate Partner Violence: Not on file   Housing Stability: Not on file         CURRENT MEDICATIONS  No current facility-administered medications on file prior to encounter.     Current Outpatient Medications on File Prior to Encounter   Medication Sig Dispense Refill    coenzyme Q-10 30 MG capsule Take 60 mg by mouth every day.      multivitamin Tab Take 1 Tablet by mouth every evening.      Glucos-Chondroit-Hyaluron-MSM (GLUCOSAMINE CHONDROITIN JOINT PO) Take 2 Tablets by mouth every evening.      NAPROXEN PO Take 1 Tablet by mouth 2 times a day as needed (For pain).      tamsulosin (FLOMAX) 0.4 MG capsule Take 1 Capsule by mouth every day. 30 Capsule 0           ALLERGIES  Allergies   Allergen Reactions    [place] Diarrhea and Vomiting     .       PHYSICAL EXAM  VITAL SIGNS:BP (!) 151/102   Pulse 82   Temp 36.8 °C (98.2 °F) (Temporal)   Resp 18   Ht 1.93 m (6' 4\")   Wt 110 kg (242 lb 8.1 oz)   SpO2 95%   BMI 29.52 kg/m²     Pulse ox interpretation: I interpret this pulse ox as normal.  VITALS - vital signs documented prior to this note have been reviewed and noted,  see EHR  GENERAL - awake and alert, no acute distress  HEENT - normocephalic, atraumatic, moist mucus membranes is a dental caries of the #15 tooth with tenderness with percussion there is no obvious drainable periapical abscess, there is no submandibular erythema tenderness or swelling.  CARDIOVASCULAR - regular rate and rhythm  PULMONARY - unlabored, no respiratory distress. No " "audible wheezing or  stridor.  NEUROLOGIC - mental status normal, speech fluid, cognition normal  MUSCULOSKELETAL -no obvious deformity or swelling  DERMATOLOGIC - warm and dry, no visible rashes  PSYCHIATRIC - normal affect, normal concentration      DIAGNOSTIC STUDIES / PROCEDURES        Radiologist interpretation:   No orders to display        COURSE & MEDICAL DECISION MAKING    ED COURSE:    ED Observation Status? no    INTERVENTIONS BY ME:  Medications   lidocaine-epinephrine 1 %-1:[id number] 1 %-1:[id number] injection 20 mL (has no administration in time range)       Response on recheck: Pain improved    PROCEDURE NOTE    Risks and benefits: risks, benefits and alternatives were discussed  Consent given by: patient and/or guardian  Patient understanding: patient/guardian states understanding of the procedure being performed  Patient consent: the patient/guardian's understanding of the procedure matches consent given  Patient identity confirmed: verbally with patient and arm band  Time out: Immediately prior to procedure a \"time out\" was called to verify the correct patient, procedure, equipment, support staff and site/side marked as required.  Location details: Left inferior alveolar nerve  Injection: using standard, sterile technique 2 cc of lidocaine  withepinepherine injected  Complication: Tolerated well without complication.          @HTN/IDDM FOLLOW UP:  The patient is referred to a primary physician for blood pressure management, diabetic screening, and for all other preventive health concerns@    INITIAL ASSESSMENT, COURSE AND PLAN  Care Narrative: Patient presented for evaluation of dental pain.  On examination he does have a dental caries of the #15 tooth there is associated left cheek swelling there is no submandibular swelling there is no obvious drainable periapical abscess.  Patient does have an upcoming dental appointment in the next 2 days.  Do believe he likely has an underlying periapical abscess " thus we will start the patient on antibiotics.  Left inferior alveolar nerve block was performed for pain control he was instructed on Tylenol ibuprofen as needed for pain return precautions were discussed and she was discharged in a stable condition             ADDITIONAL PROBLEM LIST  Dental pain  DISPOSITION AND DISCUSSIONS      Escalation of care considered, and ultimately not performed:blood analysis and diagnostic imaging no evidence of Davon Zentz labs and imaging were deferred    Decision tools and prescription drugs considered including, but not limited to: Pain Medications discussed with the patient the use of oral opioids though after a discussion they did feel comfortable using over-the-counter Tylenol and ibuprofen thus narcotics will be deferred, we will start the patient on antibiotics .    FINAL DIAGNOSIS  1. Pain, dental             Electronically signed by: Miguelangel Bullard DO ,2:16 PM 09/09/23      Dapsone Counseling: I discussed with the patient the risks of dapsone including but not limited to hemolytic anemia, agranulocytosis, rashes, methemoglobinemia, kidney failure, peripheral neuropathy, headaches, GI upset, and liver toxicity.  Patients who start dapsone require monitoring including baseline LFTs and weekly CBCs for the first month, then every month thereafter.  The patient verbalized understanding of the proper use and possible adverse effects of dapsone.  All of the patient's questions and concerns were addressed.

## 2023-09-09 NOTE — ED NOTES
Discharge instructions given and discussed. RX sent to preferred pharmacy given and pt educated. Pt educated to come back to ER for new or worsening symptoms and follow up with PCP as instructed. Pt verbalized understanding. VSS. Pt  Discharged in stable condition.

## 2023-09-09 NOTE — ED TRIAGE NOTES
"Chief Complaint   Patient presents with    Dental Pain     39 yo male ambulates to triage with reports of left lower dental pain after chipping tooth last Tuesday.  Has an appointment with his dentist on Monday.  Told to come to dentist to get antibiotics and pain control     BP (!) 151/102   Pulse 82   Temp 36.8 °C (98.2 °F) (Temporal)   Resp 18   Ht 1.93 m (6' 4\")   Wt 110 kg (242 lb 8.1 oz)   SpO2 95%   BMI 29.52 kg/m²     "

## 2023-09-18 NOTE — PROGRESS NOTES
Subjective:   9/19/2023  1:06 PM  Primary care physician: SHARYN Bhat  Referring Provider: Noe Garrido MD    Chief Complaint:   Chief Complaint   Patient presents with    New Patient     Abdominal mass     Diagnosis:   1. Abnormal CT scan  CT-ABDOMEN-PELVIS WITH    BUN+CREAT      2. Abdominal mass, LLQ (left lower quadrant)  CT-ABDOMEN-PELVIS WITH    BUN+CREAT      3. Lymphadenopathy  CT-ABDOMEN-PELVIS WITH    BUN+CREAT      4. LLQ abdominal pain  CT-ABDOMEN-PELVIS WITH    BUN+CREAT      5. Mesenteric mass          History of presenting illness:    Devin Ramesh is a pleasant 38 y.o. male with history of nausea and vomiting, constipation, LLQ abdominal pain. Was seen in ED on 7/24/23 for abdominal pain, found to have kidney stone, but also with incidental finding of mesenteric mass LLQ next to sigmoid colon. Patient reports possible unknown hx of abdominal cancer on fathers side.    CT ABDOMEN on 7/24/23 noted irregular spiculated mass within the mesentery within the left lower quadrant adjacent to the sigmoid colon. This measures 4.2 x 3.2 cm in size. There is some adjacent mesenteric lymph nodes as well as irregular stranding density within the mesentery.     Patient was most recently seen in ED on 9/9/23 for a dental problem. Abscess suspected, and block injection provided.    He is here today for evaluation of what appears to be a spiculated mass in the sigmoid mesentery.  The patient had been in the hospital for what appeared to be a kidney stone secondary to severe abdominal pain.  He underwent a CT scan without contrast to look for kidney stones.  He passed the stone but during that visit they found a spiculated mass in the mesentery of the sigmoid colon.  The patient was then referred to Dr. Garrido who did a colonoscopy and found that there was no mass in the sigmoid colon.  In any event, the patient was then referred in to see me.  Strangely, he has a family history of his father passing  away of some type of tumor that filled his abdomen pancreas liver and bowel.  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  In any event, the patient has no symptoms of flushing or diabetes or diarrhea.  He has no tach arrhythmias.  He has no other family history of malignancy.  The patient's colonoscopy was on 8/23/2023 and revealed just diverticulosis but no masses or tumors.  I have personally reviewed the patient's imaging studies and examined the patient.  He is here with his significant other to discuss neck steps.  Past Medical History:   Diagnosis Date    Kidney stone     Lumbar herniated disc     with paralysis due to pain     History reviewed. No pertinent surgical history.  Allergies   Allergen Reactions    [place] Diarrhea and Vomiting     .     Outpatient Encounter Medications as of 9/19/2023   Medication Sig Dispense Refill    acetaminophen (TYLENOL) 500 MG Tab Take 1-2 Tablets by mouth every 6 hours as needed for Moderate Pain. 30 Tablet 0    ibuprofen (MOTRIN) 600 MG Tab Take 1 Tablet by mouth every 6 hours as needed for Mild Pain or Moderate Pain. 30 Tablet 0    coenzyme Q-10 30 MG capsule Take 60 mg by mouth every day.      Glucos-Chondroit-Hyaluron-MSM (GLUCOSAMINE CHONDROITIN JOINT PO) Take 2 Tablets by mouth every evening.      NAPROXEN PO Take 1 Tablet by mouth 2 times a day as needed (For pain).      tamsulosin (FLOMAX) 0.4 MG capsule Take 1 Capsule by mouth every day. 30 Capsule 0    amoxicillin-clavulanate (AUGMENTIN) 875-125 MG Tab Take 1 Tablet by mouth 2 times a day. (Patient not taking: Reported on 9/19/2023) 14 Tablet 0    multivitamin Tab Take 1 Tablet by mouth every evening.       No facility-administered encounter medications on file as of 9/19/2023.     Social History     Socioeconomic History    Marital status:      Spouse name: Not on file    Number of children: Not on file    Years of education: Not on file    Highest education level: Not on file  "  Occupational History    Not on file   Tobacco Use    Smoking status: Never    Smokeless tobacco: Never   Vaping Use    Vaping Use: Never used   Substance and Sexual Activity    Alcohol use: Yes     Comment: once a month    Drug use: No    Sexual activity: Yes     Partners: Female     Comment:    Other Topics Concern    Not on file   Social History Narrative    Not on file     Social Determinants of Health     Financial Resource Strain: Not on file   Food Insecurity: Not on file   Transportation Needs: Not on file   Physical Activity: Not on file   Stress: Not on file   Social Connections: Not on file   Intimate Partner Violence: Not on file   Housing Stability: Not on file      Social History     Tobacco Use   Smoking Status Never   Smokeless Tobacco Never     Social History     Substance and Sexual Activity   Alcohol Use Yes    Comment: once a month     Social History     Substance and Sexual Activity   Drug Use No      Family History   Problem Relation Age of Onset    Anxiety disorder Mother     Depression Mother     Cancer Father         abdominal    Diabetes Sister         pre    Diabetes Sister         pre    No Known Problems Sister     Diabetes Brother     Autism Brother     Arterial Aneurysm Brother      Review of Systems   Gastrointestinal:  Positive for abdominal pain.   All other systems reviewed and are negative.       Objective:   /74 (BP Location: Right arm, Patient Position: Sitting)   Pulse 78   Temp 36.7 °C (98.1 °F) (Temporal)   Ht 1.93 m (6' 4\")   Wt 108 kg (238 lb)   SpO2 94%   BMI 28.97 kg/m²     Physical Exam  Vitals and nursing note reviewed.   Constitutional:       Appearance: Normal appearance.   HENT:      Head: Normocephalic.      Nose: Nose normal.      Mouth/Throat:      Mouth: Mucous membranes are moist.      Pharynx: Oropharynx is clear.   Eyes:      Pupils: Pupils are equal, round, and reactive to light.   Cardiovascular:      Rate and Rhythm: Normal rate and " regular rhythm.      Pulses: Normal pulses.      Heart sounds: Normal heart sounds.   Pulmonary:      Effort: Pulmonary effort is normal.      Breath sounds: Normal breath sounds.   Abdominal:      General: Abdomen is flat. Bowel sounds are normal.      Palpations: Abdomen is soft.   Musculoskeletal:         General: Normal range of motion.      Cervical back: Normal range of motion and neck supple.   Skin:     General: Skin is warm.   Neurological:      General: No focal deficit present.      Mental Status: He is alert and oriented to person, place, and time.   Psychiatric:         Mood and Affect: Mood normal.         Behavior: Behavior normal.         Thought Content: Thought content normal.         Judgment: Judgment normal.         Labs   Latest Reference Range & Units 07/24/23 06:48   WBC 4.8 - 10.8 K/uL 13.8 (H)   RBC 4.70 - 6.10 M/uL 5.28   Hemoglobin 14.0 - 18.0 g/dL 15.7   Hematocrit 42.0 - 52.0 % 45.6   MCV 81.4 - 97.8 fL 86.4   MCH 27.0 - 33.0 pg 29.7   MCHC 32.3 - 36.5 g/dL 34.4   RDW 35.9 - 50.0 fL 35.4 (L)   Platelet Count 164 - 446 K/uL 372   MPV 9.0 - 12.9 fL 9.7   (H): Data is abnormally high  (L): Data is abnormally low        Latest Reference Range & Units 07/24/23 06:48   Sodium 135 - 145 mmol/L 140   Potassium 3.6 - 5.5 mmol/L 4.2   Chloride 96 - 112 mmol/L 102   Co2 20 - 33 mmol/L 21   Anion Gap 7.0 - 16.0  17.0 (H)   Glucose 65 - 99 mg/dL 160 (H)   Bun 8 - 22 mg/dL 25 (H)   Creatinine 0.50 - 1.40 mg/dL 1.53 (H)   GFR (CKD-EPI) >60 mL/min/1.73 m 2 59 !   Calcium 8.4 - 10.2 mg/dL 10.0   Correct Calcium 8.5 - 10.5 mg/dL 9.8   AST(SGOT) 12 - 45 U/L 22   ALT(SGPT) 2 - 50 U/L 29   Alkaline Phosphatase 30 - 99 U/L 90   Total Bilirubin 0.1 - 1.5 mg/dL 0.5   Albumin 3.2 - 4.9 g/dL 4.2   Total Protein 6.0 - 8.2 g/dL 8.4 (H)   Globulin 1.9 - 3.5 g/dL 4.2 (H)   A-G Ratio g/dL 1.0   Lipase 11 - 82 U/L 15   (H): Data is abnormally high  !: Data is abnormal    Imaging  CT ABD W/O (7/24/23)  IMPRESSION:      1.  4 mm left ureterovesical junction stone with moderate hydronephrosis seen above that level. 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     2.  Irregular spiculated mass within the mesentery within the left lower quadrant adjacent to the sigmoid colon. This measures 4.2 x 3.2 cm in size. There is some adjacent mesenteric lymph nodes as well as irregular stranding density within the   mesentery. Consideration should be given for neoplastic processes to include carcinoid tumor as well as colon cancer and other neoplastic processes.     3.  Sigmoid diverticulosis     4.  Fatty liver.     5.  2 mm right lower pole renal calyceal stone.    Pathology  N/A    Procedures  N/A      Diagnosis:     1. Abnormal CT scan  CT-ABDOMEN-PELVIS WITH    BUN+CREAT      2. Abdominal mass, LLQ (left lower quadrant)  CT-ABDOMEN-PELVIS WITH    BUN+CREAT      3. Lymphadenopathy  CT-ABDOMEN-PELVIS WITH    BUN+CREAT      4. LLQ abdominal pain  CT-ABDOMEN-PELVIS WITH    BUN+CREAT      5. Mesenteric mass            Medical Decision Making:  Today's Assessment / Status / Plan:     In light of the present findings, the patient has a unusual fibrotic area that appears spiculated in the area of the sigmoid mesentery.  First on the list is concern for carcinoid tumor with metastatic disease to the nodes.  Unfortunately his CT scan was for renal stones thus there is no contrast.  I am recommending a CT of the abdomen and pelvis with IV contrast.  The patient will then see me back in the office after it has been completed.  They understood this and have agreed to the above plan.    I, Dr. Roblero have entered, reviewed and confirmed the above diagnoses related to this patient on this date of service, 9/19/2023  1:06 PM.    He agreed and verbalized his agreement and understanding with the current plan. I answered all questions and concerns he has at this time and advised him to call at any time in the interim with  questions or concerns in regards to his care.    Thank you for allowing me to participate in his care, I will continue to follow closely.       Please note that this dictation was created using voice recognition software. I have made every reasonable attempt to correct obvious errors, but I expect that there are errors of grammar and possibly content that I did not discover before finalizing the note.     Thank you for this consultation and allowing me to participate in your patient's care. If I can be of further service please contact my office.      I, Dr Roblero, have entered, reviewed and confirmed the above diagnoses related to this patient on this date of service, as per the time and date noted at top of this note.

## 2023-09-19 ENCOUNTER — OFFICE VISIT (OUTPATIENT)
Dept: SURGICAL ONCOLOGY | Facility: MEDICAL CENTER | Age: 38
End: 2023-09-19
Payer: COMMERCIAL

## 2023-09-19 VITALS
DIASTOLIC BLOOD PRESSURE: 74 MMHG | OXYGEN SATURATION: 94 % | SYSTOLIC BLOOD PRESSURE: 112 MMHG | HEIGHT: 76 IN | BODY MASS INDEX: 28.98 KG/M2 | TEMPERATURE: 98.1 F | WEIGHT: 238 LBS | HEART RATE: 78 BPM

## 2023-09-19 DIAGNOSIS — R10.32 LLQ ABDOMINAL PAIN: ICD-10-CM

## 2023-09-19 DIAGNOSIS — R19.04 ABDOMINAL MASS, LLQ (LEFT LOWER QUADRANT): ICD-10-CM

## 2023-09-19 DIAGNOSIS — K63.89 MESENTERIC MASS: ICD-10-CM

## 2023-09-19 DIAGNOSIS — R93.89 ABNORMAL CT SCAN: ICD-10-CM

## 2023-09-19 DIAGNOSIS — R59.1 LYMPHADENOPATHY: ICD-10-CM

## 2023-09-19 PROCEDURE — 99205 OFFICE O/P NEW HI 60 MIN: CPT | Performed by: SURGERY

## 2023-09-19 PROCEDURE — 3074F SYST BP LT 130 MM HG: CPT | Performed by: SURGERY

## 2023-09-19 PROCEDURE — 3078F DIAST BP <80 MM HG: CPT | Performed by: SURGERY

## 2023-09-19 ASSESSMENT — FIBROSIS 4 INDEX: FIB4 SCORE: 0.44

## 2023-09-19 ASSESSMENT — ENCOUNTER SYMPTOMS: ABDOMINAL PAIN: 1

## 2023-09-21 ENCOUNTER — TELEPHONE (OUTPATIENT)
Dept: SURGICAL ONCOLOGY | Facility: MEDICAL CENTER | Age: 38
End: 2023-09-21
Payer: COMMERCIAL

## 2023-09-21 NOTE — TELEPHONE ENCOUNTER
Lft msg for to call [phone] to scheduled CT scan and call [phone] for an apptmt with Dr. Roblero after CT scan is scheduled

## 2023-09-28 ENCOUNTER — HOSPITAL ENCOUNTER (EMERGENCY)
Facility: MEDICAL CENTER | Age: 38
End: 2023-09-28
Attending: EMERGENCY MEDICINE
Payer: COMMERCIAL

## 2023-09-28 ENCOUNTER — APPOINTMENT (OUTPATIENT)
Dept: RADIOLOGY | Facility: MEDICAL CENTER | Age: 38
End: 2023-09-28
Attending: EMERGENCY MEDICINE
Payer: COMMERCIAL

## 2023-09-28 VITALS
TEMPERATURE: 96.8 F | RESPIRATION RATE: 16 BRPM | HEART RATE: 63 BPM | OXYGEN SATURATION: 96 % | WEIGHT: 240.08 LBS | HEIGHT: 76 IN | BODY MASS INDEX: 29.24 KG/M2 | SYSTOLIC BLOOD PRESSURE: 135 MMHG | DIASTOLIC BLOOD PRESSURE: 65 MMHG

## 2023-09-28 DIAGNOSIS — R11.2 NAUSEA AND VOMITING, UNSPECIFIED VOMITING TYPE: ICD-10-CM

## 2023-09-28 DIAGNOSIS — R10.9 FLANK PAIN: ICD-10-CM

## 2023-09-28 DIAGNOSIS — R19.00 ABDOMINAL MASS, UNSPECIFIED ABDOMINAL LOCATION: ICD-10-CM

## 2023-09-28 LAB
ALBUMIN SERPL BCP-MCNC: 4.4 G/DL (ref 3.2–4.9)
ALBUMIN/GLOB SERPL: 1.3 G/DL
ALP SERPL-CCNC: 82 U/L (ref 30–99)
ALT SERPL-CCNC: 24 U/L (ref 2–50)
AMORPH CRY #/AREA URNS HPF: PRESENT /HPF
ANION GAP SERPL CALC-SCNC: 13 MMOL/L (ref 7–16)
APPEARANCE UR: ABNORMAL
AST SERPL-CCNC: 21 U/L (ref 12–45)
BACTERIA #/AREA URNS HPF: NEGATIVE /HPF
BASOPHILS # BLD AUTO: 0.5 % (ref 0–1.8)
BASOPHILS # BLD: 0.03 K/UL (ref 0–0.12)
BILIRUB SERPL-MCNC: 0.7 MG/DL (ref 0.1–1.5)
BILIRUB UR QL STRIP.AUTO: NEGATIVE
BUN SERPL-MCNC: 24 MG/DL (ref 8–22)
CALCIUM ALBUM COR SERPL-MCNC: 9.2 MG/DL (ref 8.5–10.5)
CALCIUM SERPL-MCNC: 9.5 MG/DL (ref 8.4–10.2)
CHLORIDE SERPL-SCNC: 104 MMOL/L (ref 96–112)
CO2 SERPL-SCNC: 24 MMOL/L (ref 20–33)
COLOR UR: YELLOW
CREAT SERPL-MCNC: 1.07 MG/DL (ref 0.5–1.4)
EOSINOPHIL # BLD AUTO: 0.14 K/UL (ref 0–0.51)
EOSINOPHIL NFR BLD: 2.6 % (ref 0–6.9)
EPI CELLS #/AREA URNS HPF: ABNORMAL /HPF
ERYTHROCYTE [DISTWIDTH] IN BLOOD BY AUTOMATED COUNT: 40.8 FL (ref 35.9–50)
GFR SERPLBLD CREATININE-BSD FMLA CKD-EPI: 90 ML/MIN/1.73 M 2
GLOBULIN SER CALC-MCNC: 3.3 G/DL (ref 1.9–3.5)
GLUCOSE SERPL-MCNC: 126 MG/DL (ref 65–99)
GLUCOSE UR STRIP.AUTO-MCNC: NEGATIVE MG/DL
GRAN CASTS #/AREA URNS LPF: ABNORMAL /LPF
HCT VFR BLD AUTO: 45.7 % (ref 42–52)
HGB BLD-MCNC: 15.5 G/DL (ref 14–18)
HYALINE CASTS #/AREA URNS LPF: ABNORMAL /LPF
IMM GRANULOCYTES # BLD AUTO: 0.01 K/UL (ref 0–0.11)
IMM GRANULOCYTES NFR BLD AUTO: 0.2 % (ref 0–0.9)
KETONES UR STRIP.AUTO-MCNC: NEGATIVE MG/DL
LEUKOCYTE ESTERASE UR QL STRIP.AUTO: NEGATIVE
LIPASE SERPL-CCNC: 25 U/L (ref 11–82)
LYMPHOCYTES # BLD AUTO: 1.23 K/UL (ref 1–4.8)
LYMPHOCYTES NFR BLD: 22.5 % (ref 22–41)
MCH RBC QN AUTO: 29.1 PG (ref 27–33)
MCHC RBC AUTO-ENTMCNC: 33.9 G/DL (ref 32.3–36.5)
MCV RBC AUTO: 85.7 FL (ref 81.4–97.8)
MICRO URNS: ABNORMAL
MONOCYTES # BLD AUTO: 0.3 K/UL (ref 0–0.85)
MONOCYTES NFR BLD AUTO: 5.5 % (ref 0–13.4)
MUCOUS THREADS #/AREA URNS HPF: ABNORMAL /HPF
NEUTROPHILS # BLD AUTO: 3.76 K/UL (ref 1.82–7.42)
NEUTROPHILS NFR BLD: 68.7 % (ref 44–72)
NITRITE UR QL STRIP.AUTO: NEGATIVE
NRBC # BLD AUTO: 0 K/UL
NRBC BLD-RTO: 0 /100 WBC (ref 0–0.2)
PH UR STRIP.AUTO: 5 [PH] (ref 5–8)
PLATELET # BLD AUTO: 263 K/UL (ref 164–446)
PMV BLD AUTO: 9.8 FL (ref 9–12.9)
POTASSIUM SERPL-SCNC: 4.2 MMOL/L (ref 3.6–5.5)
PROT SERPL-MCNC: 7.7 G/DL (ref 6–8.2)
PROT UR QL STRIP: NEGATIVE MG/DL
RBC # BLD AUTO: 5.33 M/UL (ref 4.7–6.1)
RBC # URNS HPF: ABNORMAL /HPF
RBC UR QL AUTO: ABNORMAL
SODIUM SERPL-SCNC: 141 MMOL/L (ref 135–145)
SP GR UR STRIP.AUTO: >=1.03
WBC # BLD AUTO: 5.5 K/UL (ref 4.8–10.8)
WBC #/AREA URNS HPF: ABNORMAL /HPF

## 2023-09-28 PROCEDURE — 700111 HCHG RX REV CODE 636 W/ 250 OVERRIDE (IP): Performed by: EMERGENCY MEDICINE

## 2023-09-28 PROCEDURE — 81001 URINALYSIS AUTO W/SCOPE: CPT

## 2023-09-28 PROCEDURE — 74177 CT ABD & PELVIS W/CONTRAST: CPT

## 2023-09-28 PROCEDURE — 700117 HCHG RX CONTRAST REV CODE 255: Performed by: EMERGENCY MEDICINE

## 2023-09-28 PROCEDURE — 96376 TX/PRO/DX INJ SAME DRUG ADON: CPT

## 2023-09-28 PROCEDURE — 700105 HCHG RX REV CODE 258: Performed by: EMERGENCY MEDICINE

## 2023-09-28 PROCEDURE — 80053 COMPREHEN METABOLIC PANEL: CPT

## 2023-09-28 PROCEDURE — 96374 THER/PROPH/DIAG INJ IV PUSH: CPT

## 2023-09-28 PROCEDURE — 85025 COMPLETE CBC W/AUTO DIFF WBC: CPT

## 2023-09-28 PROCEDURE — 99285 EMERGENCY DEPT VISIT HI MDM: CPT

## 2023-09-28 PROCEDURE — 36415 COLL VENOUS BLD VENIPUNCTURE: CPT

## 2023-09-28 PROCEDURE — 83690 ASSAY OF LIPASE: CPT

## 2023-09-28 PROCEDURE — 96375 TX/PRO/DX INJ NEW DRUG ADDON: CPT

## 2023-09-28 RX ORDER — OXYCODONE HYDROCHLORIDE AND ACETAMINOPHEN 5; 325 MG/1; MG/1
1 TABLET ORAL EVERY 4 HOURS PRN
COMMUNITY

## 2023-09-28 RX ORDER — OXYCODONE HYDROCHLORIDE AND ACETAMINOPHEN 5; 325 MG/1; MG/1
1 TABLET ORAL EVERY 6 HOURS PRN
Qty: 16 TABLET | Refills: 0 | Status: SHIPPED | OUTPATIENT
Start: 2023-09-28 | End: 2023-10-02

## 2023-09-28 RX ORDER — KETOROLAC TROMETHAMINE 30 MG/ML
15 INJECTION, SOLUTION INTRAMUSCULAR; INTRAVENOUS ONCE
Status: COMPLETED | OUTPATIENT
Start: 2023-09-28 | End: 2023-09-28

## 2023-09-28 RX ORDER — HYDROMORPHONE HYDROCHLORIDE 1 MG/ML
0.5 INJECTION, SOLUTION INTRAMUSCULAR; INTRAVENOUS; SUBCUTANEOUS ONCE
Status: COMPLETED | OUTPATIENT
Start: 2023-09-28 | End: 2023-09-28

## 2023-09-28 RX ORDER — SODIUM CHLORIDE, SODIUM LACTATE, POTASSIUM CHLORIDE, CALCIUM CHLORIDE 600; 310; 30; 20 MG/100ML; MG/100ML; MG/100ML; MG/100ML
1000 INJECTION, SOLUTION INTRAVENOUS ONCE
Status: COMPLETED | OUTPATIENT
Start: 2023-09-28 | End: 2023-09-28

## 2023-09-28 RX ORDER — ONDANSETRON 2 MG/ML
4 INJECTION INTRAMUSCULAR; INTRAVENOUS ONCE
Status: COMPLETED | OUTPATIENT
Start: 2023-09-28 | End: 2023-09-28

## 2023-09-28 RX ORDER — ONDANSETRON 4 MG/1
4 TABLET, ORALLY DISINTEGRATING ORAL EVERY 6 HOURS PRN
Qty: 20 TABLET | Refills: 0 | Status: SHIPPED | OUTPATIENT
Start: 2023-09-28

## 2023-09-28 RX ORDER — IBUPROFEN 600 MG/1
600 TABLET ORAL EVERY 6 HOURS PRN
Qty: 30 TABLET | Refills: 0 | Status: SHIPPED | OUTPATIENT
Start: 2023-09-28

## 2023-09-28 RX ADMIN — IOHEXOL 100 ML: 350 INJECTION, SOLUTION INTRAVENOUS at 10:03

## 2023-09-28 RX ADMIN — SODIUM CHLORIDE, POTASSIUM CHLORIDE, SODIUM LACTATE AND CALCIUM CHLORIDE 1000 ML: 600; 310; 30; 20 INJECTION, SOLUTION INTRAVENOUS at 09:23

## 2023-09-28 RX ADMIN — FENTANYL CITRATE 100 MCG: 50 INJECTION, SOLUTION INTRAMUSCULAR; INTRAVENOUS at 09:29

## 2023-09-28 RX ADMIN — KETOROLAC TROMETHAMINE 15 MG: 30 INJECTION, SOLUTION INTRAMUSCULAR; INTRAVENOUS at 09:29

## 2023-09-28 RX ADMIN — HYDROMORPHONE HYDROCHLORIDE 0.5 MG: 1 INJECTION, SOLUTION INTRAMUSCULAR; INTRAVENOUS; SUBCUTANEOUS at 11:01

## 2023-09-28 RX ADMIN — ONDANSETRON 4 MG: 2 INJECTION INTRAMUSCULAR; INTRAVENOUS at 09:23

## 2023-09-28 RX ADMIN — ONDANSETRON 4 MG: 2 INJECTION INTRAMUSCULAR; INTRAVENOUS at 11:00

## 2023-09-28 ASSESSMENT — FIBROSIS 4 INDEX: FIB4 SCORE: 0.44

## 2023-09-28 ASSESSMENT — PAIN DESCRIPTION - PAIN TYPE: TYPE: ACUTE PAIN

## 2023-09-28 NOTE — ED NOTES
Patient walks in ambulatory complaining of right sided flank pain. Patient has a history of stones. Patient states this eels the exact same as previous build ups. Patient said the pain started at 0600.

## 2023-09-28 NOTE — ED NOTES
Pharmacy Medication Reconciliation    ~Med rec updated and complete per patient & patient spouse at bedside   ~Allergies have been verified and updated  ~Pt home pharmacy: [facility]      ~Patient reports that he completed a 7 day course of Augmentin that was started on 09/09/2023      ~Anticoagulants (rivaroxaban, apixaban, edoxaban, dabigatran, warfarin) taken in the last 14 days? No

## 2023-09-28 NOTE — ED TRIAGE NOTES
"Chief Complaint   Patient presents with    Flank Pain     R side pain that started around 0600 this AM; pt has hx of kidney stones and states this feels similar     /87   Pulse 62   Temp 36.7 °C (98 °F) (Temporal)   Resp 16   Ht 1.93 m (6' 4\")   Wt 109 kg (240 lb 1.3 oz)   SpO2 98%   BMI 29.22 kg/m²       "

## 2023-09-28 NOTE — DISCHARGE INSTRUCTIONS
Please use the medications as needed to help with your symptoms.  As we discussed it is possible you have a small kidney stone that is not showing up on your scan with your prior stone it is important to follow-up with urology.  Additionally you do have the mass and should follow-up with Dr. Roblero as above.  Seek more immediate medical attention for any uncontrolled pain, persistent vomiting, fevers or other concerns

## 2023-09-28 NOTE — ED NOTES
Patient given discharge instructions, questions and concerns addressed.  Patient ambulated out with wife

## 2023-09-28 NOTE — ED PROVIDER NOTES
ED Provider Note    CHIEF COMPLAINT  Chief Complaint   Patient presents with    Flank Pain     R side pain that started around 0600 this AM; pt has hx of kidney stones and states this feels similar       EXTERNAL RECORDS REVIEWED  Outpatient Notes from Dr. GOSS September 2023 had abdominal mass seen on CT scan in the mesentery, was recommended to have CT with contrast    HPI/ROS  LIMITATION TO HISTORY   Select: : None  OUTSIDE HISTORIAN(S):  none    Devin Ramesh is a 38 y.o. male who presents right-sided lower abdominal and flank pain.  Patient reports he awoke early this morning with the pain, has been fairly constant since that point.  It is sharp, no real alleviating or any factors, does have some associated nausea and vomiting.  He reports no dysuria or hematuria.  No fevers or chills.    He does have prior history of ureteral stones and this feels similar    PAST MEDICAL HISTORY   has a past medical history of Kidney stone and Lumbar herniated disc.    SURGICAL HISTORY  patient denies any surgical history    FAMILY HISTORY  Family History   Problem Relation Age of Onset    Anxiety disorder Mother     Depression Mother     Cancer Father         abdominal    Diabetes Sister         pre    Diabetes Sister         pre    No Known Problems Sister     Diabetes Brother     Autism Brother     Arterial Aneurysm Brother        SOCIAL HISTORY  Social History     Tobacco Use    Smoking status: Never    Smokeless tobacco: Never   Vaping Use    Vaping Use: Never used   Substance and Sexual Activity    Alcohol use: Yes     Comment: once a month    Drug use: No    Sexual activity: Yes     Partners: Female     Comment:        CURRENT MEDICATIONS  Home Medications       Reviewed by Rodrick Longoria (Pharmacy Tech) on 09/28/23 at 0932  Med List Status: Complete     Medication Last Dose Status   acetaminophen (TYLENOL) 500 MG Tab LAST WEEK Active   amoxicillin-clavulanate (AUGMENTIN) 875-125 MG Tab COMPLETE  "Active   coenzyme Q-10 30 MG capsule 9/27/2023 Active   Glucos-Chondroit-Hyaluron-MSM (GLUCOSAMINE CHONDROITIN JOINT PO) 9/27/2023 Active   ibuprofen (MOTRIN) 600 MG Tab LAST WEEK Active   multivitamin Tab 9/27/2023 Active   NAPROXEN PO 9/27/2023 Active   oxyCODONE-acetaminophen (PERCOCET) 5-325 MG Tab 9/28/2023 Active   tamsulosin (FLOMAX) 0.4 MG capsule 9/28/2023 Active                    ALLERGIES  Allergies   Allergen Reactions    [place] Diarrhea and Vomiting     .       PHYSICAL EXAM  VITAL SIGNS: /65   Pulse 63   Temp 36 °C (96.8 °F) (Temporal)   Resp 16   Ht 1.93 m (6' 4\")   Wt 109 kg (240 lb 1.3 oz)   SpO2 96%   BMI 29.22 kg/m²      Pulse ox interpretation: I interpret this pulse ox as normal.  Constitutional: Alert uncomfortable  HENT: No signs of trauma, Bilateral external ears normal, Nose normal.   Eyes: Pupils are equal and reactive, Conjunctiva normal, Non-icteric.   Neck: Normal range of motion, No tenderness, Supple, No stridor.   Cardiovascular: Regular rate and rhythm, no murmurs.   Thorax & Lungs: Normal breath sounds, No respiratory distress, No wheezing, No chest tenderness.   Abdomen: Bowel sounds normal, Soft, No tenderness, No masses, No pulsatile masses. No peritoneal signs.  Skin: Warm, Dry, No erythema, No rash.   Back: No bony tenderness, No CVA tenderness.   Extremities: Intact distal pulses, No edema, No tenderness, No cyanosis,  Negative Angi's sign.   Musculoskeletal: Good range of motion in all major joints. No tenderness to palpation or major deformities noted.   Neurologic: Alert , Normal motor function, Normal sensory function, No focal deficits noted.   Psychiatric: Affect normal, Judgment normal, Mood normal.               DIAGNOSTIC STUDIES / PROCEDURES  Labs Reviewed   URINALYSIS,CULTURE IF INDICATED - Abnormal; Notable for the following components:       Result Value    Character Hazy (*)     Occult Blood Large (*)     All other components within normal limits "    Narrative:     Indication for culture:->Patient WITHOUT an indwelling Brito  catheter in place with new onset of Dysuria, Frequency,  Urgency, and/or Suprapubic pain   URINE MICROSCOPIC (W/UA) - Abnormal; Notable for the following components:    WBC 2-5 (*)     RBC  (*)     All other components within normal limits    Narrative:     Indication for culture:->Patient WITHOUT an indwelling Brito  catheter in place with new onset of Dysuria, Frequency,  Urgency, and/or Suprapubic pain   COMP METABOLIC PANEL - Abnormal; Notable for the following components:    Glucose 126 (*)     Bun 24 (*)     All other components within normal limits   CBC WITH DIFFERENTIAL   LIPASE   ESTIMATED GFR       RADIOLOGY  I have independently interpreted the diagnostic imaging associated with this visit and am waiting the final reading from the radiologist.   My preliminary interpretation is as follows: no free air  Radiologist interpretation: .  CT-ABDOMEN-PELVIS WITH   Final Result      1.  Highly suspicious irregular mass within the left lower quadrant mesentery measuring 4.0 x 3.9 cm. Differential diagnosis is primarily of carcinoid tumor or other primary malignancy in the mesentery      2.  Left colon diverticulosis      3.  No other significant finding              COURSE & MEDICAL DECISION MAKING    ED Observation Status? Yes; I am placing the patient in to an observation status due to a diagnostic uncertainty as well as therapeutic intensity. Patient placed in observation status at 8:56 AM, 9/28/2023.     Observation plan is as follows: Tolerance of orals, management of pain, diagnostic evaluation as below    Upon Reevaluation, the patient's condition has: Improved; and will be discharged.    Patient discharged from ED Observation status at 1205 (Time) 09/28/23   (Date).     INITIAL ASSESSMENT, COURSE AND PLAN  Care Narrative: 8:56 AM  Patient is evaluated at the bedside, at this point differential includes acute pathology  such as ureteral stone, urinary tract infection, appendicitis is also considered, obstruction, patient was also recently found to have potential abdominal mass and recommended to have CT with contrast by Dr. GOSS, and given this we will obtain a CT with contrast to evaluate his symptoms today as well as diagnostic labs, urinalysis.  I ordered for IV fluids, Zofran, fentanyl and Toradol    10:48 AM  Patient is reevaluated, he reports the pain did improve but is now returning, additional medication is ordered    Case discussed with surgical oncology.  In review of his case, patient would be appropriate for outpatient follow-up in the clinic next week    1200  Patient is reevaluated, he is comfortable, discussed all results, patient states he is comfortable and like to go home, plan for discharge    HYDRATION: Based on the patient's presentation of Acute Vomiting the patient was given IV fluids. IV Hydration was used because oral hydration was not as rapid as required. Upon recheck following hydration, the patient was improved.      ADDITIONAL PROBLEM LIST  #Flank pain.  The nature of his symptoms as well as his microscopic hematuria and prior history are suggestive of ureteral stone, there is no large stone noted on his CT scan although potential for small stone not seen on the scan.  No findings of urinary obstruction, no findings of urinary tract infection.  CT also shows no evidence of acute surgical pathology including appendicitis, symptoms have been well controlled.  And he will follow-up with urology as he is going to follow-up with them for a prior stone as well    #Abdominal mass.  Seen on CT, patient recently established care with surgical oncology, case was discussed with them and they will follow-up in the clinic      DISPOSITION AND DISCUSSIONS  I have discussed management of the patient with the following physicians and SAVANNA's: Surgical oncology as above      Barriers to care at this time, including but not  limited to:  none .     Decision tools and prescription drugs considered including, but not limited to:  Prescriptions given for pain medications as above,  I reviewed prescription monitoring program for patient's narcotic use before prescribing a scheduled drug.The patient will not drink alcohol nor drive with prescribed medications. The patient will return for new or worsening symptoms and is stable at the time of discharge.    The patient is referred to a primary physician for blood pressure management, diabetic screening, and for all other preventative health concerns.    In prescribing controlled substances to this patient, I certify that I have obtained and reviewed the medical history of Devin Ramesh. I have also made a good jacqui effort to obtain applicable records from other providers who have treated the patient and records did not demonstrate any increased risk of substance abuse that would prevent me from prescribing controlled substances.     I have conducted a physical exam and documented it. I have reviewed Mr. Ramesh’s prescription history as maintained by the [place] Prescription Monitoring Program.     I have assessed the patient’s risk for abuse, dependency, and addiction using the validated Opioid Risk Tool available at https://www.mdcalc.com/ijwion-refi-spvj-ort-narcotic-abuse.     Given the above, I believe the benefits of controlled substance therapy outweigh the risks. The reasons for prescribing controlled substances include non-narcotic, oral analgesic alternatives have been inadequate for pain control. Accordingly, I have discussed the risk and benefits, treatment plan, and alternative therapies with the patient.       DISPOSITION:  Patient will be discharged home in stable condition.    FOLLOW UP:  Gurjit Roblero M.D.  [address] [facility] [address]  [facility] [address]  [phone]    Schedule an appointment as soon as possible for a visit       [employer] M.D.  [address] [place] [address]  [phone]            OUTPATIENT MEDICATIONS:  Discharge Medication List as of 9/28/2023 12:16 PM        START taking these medications    Details   !! oxyCODONE-acetaminophen (PERCOCET) 5-325 MG Tab Take 1 Tablet by mouth every 6 hours as needed for Moderate Pain for up to 4 days., Disp-16 Tablet, R-0, Normal      !! ibuprofen (MOTRIN) 600 MG Tab Take 1 Tablet by mouth every 6 hours as needed for Moderate Pain., Disp-30 Tablet, R-0, Normal      ondansetron (ZOFRAN ODT) 4 MG TABLET DISPERSIBLE Take 1 Tablet by mouth every 6 hours as needed for Nausea/Vomiting., Disp-20 Tablet, R-0, Normal       !! - Potential duplicate medications found. Please discuss with provider.        .    FINAL DIAGNOSIS  1. Flank pain    2. Nausea and vomiting, unspecified vomiting type    3. Abdominal mass, unspecified abdominal location           Electronically signed by: Devin Bautista M.D., 9/28/2023 8:55 AM

## 2024-01-23 ENCOUNTER — TELEPHONE (OUTPATIENT)
Dept: SURGICAL ONCOLOGY | Facility: MEDICAL CENTER | Age: 39
End: 2024-01-23
Payer: COMMERCIAL

## 2024-01-23 DIAGNOSIS — R19.04 ABDOMINAL MASS, LLQ (LEFT LOWER QUADRANT): ICD-10-CM

## 2024-02-29 ENCOUNTER — HOSPITAL ENCOUNTER (OUTPATIENT)
Dept: RADIOLOGY | Facility: MEDICAL CENTER | Age: 39
End: 2024-02-29
Attending: NURSE PRACTITIONER
Payer: COMMERCIAL

## 2024-02-29 ENCOUNTER — HOSPITAL ENCOUNTER (EMERGENCY)
Facility: MEDICAL CENTER | Age: 39
End: 2024-02-29
Payer: COMMERCIAL

## 2024-02-29 DIAGNOSIS — R19.04 ABDOMINAL MASS, LLQ (LEFT LOWER QUADRANT): ICD-10-CM

## 2024-02-29 PROCEDURE — 700117 HCHG RX CONTRAST REV CODE 255: Performed by: NURSE PRACTITIONER

## 2024-02-29 PROCEDURE — 74177 CT ABD & PELVIS W/CONTRAST: CPT

## 2024-02-29 RX ADMIN — IOHEXOL 100 ML: 350 INJECTION, SOLUTION INTRAVENOUS at 17:54

## 2024-04-05 NOTE — PROGRESS NOTES
Subjective:   4/9/2024  8:19 AM  Primary care physician: SHARYN Bhat  Referring Provider: Noe Garrido MD     Chief Complaint:   Chief Complaint   Patient presents with    Follow-Up     LLQ MESENTARY  MASS 4 CM  CT: 3/1 RESOLVING??        Diagnosis:   1. Abdominal mass, LLQ (left lower quadrant)        2. Abnormal CT scan        3. Lymphadenopathy        4. LLQ abdominal pain        5. Mesenteric mass        6. Diverticulosis          History of presenting illness:    Devin Ramesh is a pleasant 38 y.o. male with history of nausea and vomiting, constipation, LLQ abdominal pain. Was seen in ED on 7/24/23 for abdominal pain, found to have kidney stone, but also with incidental finding of mesenteric mass LLQ next to sigmoid colon. Patient reports possible unknown hx of abdominal cancer on fathers side.     CT ABDOMEN on 7/24/23 noted irregular spiculated mass within the mesentery within the left lower quadrant adjacent to the sigmoid colon. This measures 4.2 x 3.2 cm in size. There is some adjacent mesenteric lymph nodes as well as irregular stranding density within the mesentery.      Patient was most recently seen in ED on 9/9/23 for a dental problem. Abscess suspected, and block injection provided.     He is here today for evaluation of what appears to be a spiculated mass in the sigmoid mesentery.  The patient had been in the hospital for what appeared to be a kidney stone secondary to severe abdominal pain.  He underwent a CT scan without contrast to look for kidney stones.  He passed the stone but during that visit they found a spiculated mass in the mesentery of the sigmoid colon.  The patient was then referred to Dr. Garrido who did a colonoscopy and found that there was no mass in the sigmoid colon.  In any event, the patient was then referred in to see me.  Strangely, he has a family history of his father passing away of some type of tumor that  filled his abdomen pancreas liver and bowel.  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  In any event, the patient has no symptoms of flushing or diabetes or diarrhea.  He has no tach arrhythmias.  He has no other family history of malignancy.  The patient's colonoscopy was on 8/23/2023 and revealed just diverticulosis but no masses or tumors.  I have personally reviewed the patient's imaging studies and examined the patient.  He is here with his significant other to discuss next steps.    Update 4/9/24  CT ABDOMEN on 3/1/24 noted the previous irregular mass in the left lower quadrant has mostly resolved; there are few residual spiculations. Additionally, new small foci of gas are present in this area. Mild wall thickening of the sigmoid colon is observed, with multiple diverticula adjacent to this area. There is also mild wall thickening of the sigmoid colon, with multiple diverticula adjacent to this region. Additionally, the surrounding mesenteric tissue appears mildly prominent.    He is here today for follow-up status post evaluation what was found to be a mesenteric mass adjacent sigmoid colon.  There was multiple diverticula at the time and the concern was for possible carcinoid tumor from the sigmoid colon.  He underwent a flexible sigmoidoscopy/colonoscopy by Dr. Garrido it was revealed he had what appeared to be IBD and for inflammatory changes along with diverticulosis.  His repeat imaging which I personally reviewed on the CT scan reveals multiple diverticula in this mesenteric mass has resolved except there is a small pocket of air which is consistent with possible diverticular perforation and loculation.  Patient is completely asymptomatic.  Denies any fever or chills, nausea or vomiting.  His pathology was negative for any malignancy in his colonoscopy was negative for any other tumors.  He is here discuss neck steps.    Past Medical History:   Diagnosis Date    Kidney stone      Lumbar herniated disc     with paralysis due to pain     History reviewed. No pertinent surgical history.  Allergies   Allergen Reactions    [place] Diarrhea and Vomiting     .     Outpatient Encounter Medications as of 4/9/2024   Medication Sig Dispense Refill    oxyCODONE-acetaminophen (PERCOCET) 5-325 MG Tab Take 1 Tablet by mouth every four hours as needed for Severe Pain.      ibuprofen (MOTRIN) 600 MG Tab Take 1 Tablet by mouth every 6 hours as needed for Moderate Pain. 30 Tablet 0    ondansetron (ZOFRAN ODT) 4 MG TABLET DISPERSIBLE Take 1 Tablet by mouth every 6 hours as needed for Nausea/Vomiting. 20 Tablet 0    acetaminophen (TYLENOL) 500 MG Tab Take 1-2 Tablets by mouth every 6 hours as needed for Moderate Pain. 30 Tablet 0    ibuprofen (MOTRIN) 600 MG Tab Take 1 Tablet by mouth every 6 hours as needed for Mild Pain or Moderate Pain. 30 Tablet 0    amoxicillin-clavulanate (AUGMENTIN) 875-125 MG Tab Take 1 Tablet by mouth 2 times a day. 14 Tablet 0    coenzyme Q-10 30 MG capsule Take 30 mg by mouth every evening.      multivitamin Tab Take 1 Tablet by mouth every evening.      Glucos-Chondroit-Hyaluron-MSM (GLUCOSAMINE CHONDROITIN JOINT PO) Take 1 Tablet by mouth every evening.      NAPROXEN PO Take 220 mg by mouth every evening.      tamsulosin (FLOMAX) 0.4 MG capsule Take 1 Capsule by mouth every day. 30 Capsule 0     No facility-administered encounter medications on file as of 4/9/2024.     Social History     Socioeconomic History    Marital status:      Spouse name: Not on file    Number of children: Not on file    Years of education: Not on file    Highest education level: Not on file   Occupational History    Not on file   Tobacco Use    Smoking status: Never    Smokeless tobacco: Never   Vaping Use    Vaping Use: Never used   Substance and Sexual Activity    Alcohol use: Yes     Comment: once a month    Drug use: No    Sexual activity: Yes     Partners: Female     Comment:    Other  "Topics Concern    Not on file   Social History Narrative    Not on file     Social Determinants of Health     Financial Resource Strain: Not on file   Food Insecurity: Not on file   Transportation Needs: Not on file   Physical Activity: Not on file   Stress: Not on file   Social Connections: Not on file   Intimate Partner Violence: Not on file   Housing Stability: Not on file      Social History     Tobacco Use   Smoking Status Never   Smokeless Tobacco Never     Social History     Substance and Sexual Activity   Alcohol Use Yes    Comment: once a month     Social History     Substance and Sexual Activity   Drug Use No      Family History   Problem Relation Age of Onset    Anxiety disorder Mother     Depression Mother     Cancer Father         abdominal    Diabetes Sister         pre    Diabetes Sister         pre    No Known Problems Sister     Diabetes Brother     Autism Brother     Arterial Aneurysm Brother        Review of Systems   All other systems reviewed and are negative.       Objective:   /78 (BP Location: Right arm, Patient Position: Sitting, BP Cuff Size: Small adult)   Pulse 80   Temp 36.6 °C (97.8 °F) (Temporal)   Ht 1.93 m (6' 4\")   Wt 111 kg (245 lb)   SpO2 98%   BMI 29.82 kg/m²     Physical Exam  Vitals and nursing note reviewed.   Constitutional:       Appearance: Normal appearance.   HENT:      Head: Normocephalic.      Mouth/Throat:      Mouth: Mucous membranes are moist.      Pharynx: Oropharynx is clear.   Eyes:      Extraocular Movements: Extraocular movements intact.      Conjunctiva/sclera: Conjunctivae normal.      Pupils: Pupils are equal, round, and reactive to light.   Cardiovascular:      Rate and Rhythm: Normal rate and regular rhythm.      Pulses: Normal pulses.      Heart sounds: Normal heart sounds.   Pulmonary:      Effort: Pulmonary effort is normal.      Breath sounds: Normal breath sounds.   Abdominal:      General: Abdomen is flat. Bowel sounds are normal.      " Palpations: Abdomen is soft.   Musculoskeletal:         General: Normal range of motion.      Cervical back: Normal range of motion and neck supple.   Skin:     General: Skin is warm.   Neurological:      General: No focal deficit present.      Mental Status: He is alert and oriented to person, place, and time. Mental status is at baseline.   Psychiatric:         Mood and Affect: Mood normal.         Behavior: Behavior normal.         Thought Content: Thought content normal.         Judgment: Judgment normal.         Labs    Latest Reference Range & Units 07/24/23 06:48   WBC 4.8 - 10.8 K/uL 13.8 (H)   RBC 4.70 - 6.10 M/uL 5.28   Hemoglobin 14.0 - 18.0 g/dL 15.7   Hematocrit 42.0 - 52.0 % 45.6   MCV 81.4 - 97.8 fL 86.4   MCH 27.0 - 33.0 pg 29.7   MCHC 32.3 - 36.5 g/dL 34.4   RDW 35.9 - 50.0 fL 35.4 (L)   Platelet Count 164 - 446 K/uL 372   MPV 9.0 - 12.9 fL 9.7   (H): Data is abnormally high  (L): Data is abnormally low          Latest Reference Range & Units 07/24/23 06:48   Sodium 135 - 145 mmol/L 140   Potassium 3.6 - 5.5 mmol/L 4.2   Chloride 96 - 112 mmol/L 102   Co2 20 - 33 mmol/L 21   Anion Gap 7.0 - 16.0  17.0 (H)   Glucose 65 - 99 mg/dL 160 (H)   Bun 8 - 22 mg/dL 25 (H)   Creatinine 0.50 - 1.40 mg/dL 1.53 (H)   GFR (CKD-EPI) >60 mL/min/1.73 m 2 59 !   Calcium 8.4 - 10.2 mg/dL 10.0   Correct Calcium 8.5 - 10.5 mg/dL 9.8   AST(SGOT) 12 - 45 U/L 22   ALT(SGPT) 2 - 50 U/L 29   Alkaline Phosphatase 30 - 99 U/L 90   Total Bilirubin 0.1 - 1.5 mg/dL 0.5   Albumin 3.2 - 4.9 g/dL 4.2   Total Protein 6.0 - 8.2 g/dL 8.4 (H)   Globulin 1.9 - 3.5 g/dL 4.2 (H)   A-G Ratio g/dL 1.0   Lipase 11 - 82 U/L 15   (H): Data is abnormally high  !: Data is abnormal     Imaging  CT ABDOMEN (3/1/24)  IMPRESSION:  1. The previous irregular mass in the left lower quadrant has mostly resolved; there are few residual spiculations. Additionally, new small foci of gas are present in this area. Mild wall thickening of the sigmoid colon is  observed, with multiple   diverticula adjacent to this area.  There is also mild wall thickening of the sigmoid colon, with multiple diverticula adjacent to this region. Additionally, the surrounding mesenteric tissue appears mildly prominent.     Given these changes, it's possible they could be resolving infection or inflammation rather than a mass, especially if there has been no interval treatment. The presence of gas raises concerns for fistulous formation.    CT ABD W/O (7/24/23)  IMPRESSION:     1.  4 mm left ureterovesical junction stone with moderate hydronephrosis seen above that level. 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     2.  Irregular spiculated mass within the mesentery within the left lower quadrant adjacent to the sigmoid colon. This measures 4.2 x 3.2 cm in size. There is some adjacent mesenteric lymph nodes as well as irregular stranding density within the   mesentery. Consideration should be given for neoplastic processes to include carcinoid tumor as well as colon cancer and other neoplastic processes.     3.  Sigmoid diverticulosis     4.  Fatty liver.     5.  2 mm right lower pole renal calyceal stone.     Pathology  N/A     Procedures  N/A    Diagnosis:     1. Abdominal mass, LLQ (left lower quadrant)        2. Abnormal CT scan        3. Lymphadenopathy        4. LLQ abdominal pain        5. Mesenteric mass        6. Diverticulosis            Medical Decision Making:  Today's Assessment / Status / Plan:     In light of the present findings, the patient appears to most likely have had a diverticular attack with some air in the area of that mesenteric mass.  It is essentially resolved thus it is not related to malignancy and his recent sigmoidoscopy showed possible inflammatory bowel disease versus diverticulosis/diverticulitis.  My recommendation is for him to follow-up with Dr. Garrido for a repeat colonoscopy/flexible sigmoidoscopy at a later date 6 to 12 months  from now.  He is being discharged from my clinic and will follow-up as needed.    I, Dr. Roblero have entered, reviewed and confirmed the above diagnoses related to this patient on this date of service, 4/9/2024  8:19 AM.    He agreed and verbalized his agreement and understanding with the current plan. I answered all questions and concerns he has at this time and advised him to call at any time in the interim with questions or concerns in regards to his care.    Thank you for allowing me to participate in his care, I will continue to follow closely.       Please note that this dictation was created using voice recognition software. I have made every reasonable attempt to correct obvious errors, but I expect that there are errors of grammar and possibly content that I did not discover before finalizing the note.     Thank you for this consultation and allowing me to participate in your patient's care. If I can be of further service please contact my office.      I, Dr Roblero, have entered, reviewed and confirmed the above diagnoses related to this patient on this date of service, as per the time and date noted at top of this note.

## 2024-04-09 ENCOUNTER — OFFICE VISIT (OUTPATIENT)
Dept: SURGICAL ONCOLOGY | Facility: MEDICAL CENTER | Age: 39
End: 2024-04-09
Payer: COMMERCIAL

## 2024-04-09 VITALS
DIASTOLIC BLOOD PRESSURE: 78 MMHG | BODY MASS INDEX: 29.83 KG/M2 | OXYGEN SATURATION: 98 % | HEIGHT: 76 IN | WEIGHT: 245 LBS | TEMPERATURE: 97.8 F | SYSTOLIC BLOOD PRESSURE: 114 MMHG | HEART RATE: 80 BPM

## 2024-04-09 DIAGNOSIS — R10.32 LLQ ABDOMINAL PAIN: ICD-10-CM

## 2024-04-09 DIAGNOSIS — K63.89 MESENTERIC MASS: ICD-10-CM

## 2024-04-09 DIAGNOSIS — R19.04 ABDOMINAL MASS, LLQ (LEFT LOWER QUADRANT): ICD-10-CM

## 2024-04-09 DIAGNOSIS — R93.89 ABNORMAL CT SCAN: ICD-10-CM

## 2024-04-09 DIAGNOSIS — K57.90 DIVERTICULOSIS: ICD-10-CM

## 2024-04-09 DIAGNOSIS — R59.1 LYMPHADENOPATHY: ICD-10-CM

## 2024-04-09 PROCEDURE — 99214 OFFICE O/P EST MOD 30 MIN: CPT | Performed by: SURGERY

## 2024-04-09 PROCEDURE — 3078F DIAST BP <80 MM HG: CPT | Performed by: SURGERY

## 2024-04-09 PROCEDURE — 3074F SYST BP LT 130 MM HG: CPT | Performed by: SURGERY

## 2024-04-09 ASSESSMENT — FIBROSIS 4 INDEX: FIB4 SCORE: 0.65

## 2024-08-01 ENCOUNTER — APPOINTMENT (OUTPATIENT)
Dept: MEDICAL GROUP | Facility: MEDICAL CENTER | Age: 39
End: 2024-08-01
Payer: COMMERCIAL

## 2024-12-03 ENCOUNTER — APPOINTMENT (OUTPATIENT)
Dept: MEDICAL GROUP | Facility: MEDICAL CENTER | Age: 39
End: 2024-12-03
Payer: COMMERCIAL

## 2024-12-03 VITALS
BODY MASS INDEX: 28.49 KG/M2 | DIASTOLIC BLOOD PRESSURE: 74 MMHG | TEMPERATURE: 98.2 F | OXYGEN SATURATION: 96 % | HEIGHT: 76 IN | WEIGHT: 234 LBS | HEART RATE: 85 BPM | SYSTOLIC BLOOD PRESSURE: 132 MMHG

## 2024-12-03 DIAGNOSIS — Z13.6 ENCOUNTER FOR LIPID SCREENING FOR CARDIOVASCULAR DISEASE: ICD-10-CM

## 2024-12-03 DIAGNOSIS — Z30.09 VASECTOMY EVALUATION: ICD-10-CM

## 2024-12-03 DIAGNOSIS — Z13.220 ENCOUNTER FOR LIPID SCREENING FOR CARDIOVASCULAR DISEASE: ICD-10-CM

## 2024-12-03 DIAGNOSIS — Z23 IMMUNIZATION DUE: ICD-10-CM

## 2024-12-03 DIAGNOSIS — Z13.29 THYROID DISORDER SCREENING: ICD-10-CM

## 2024-12-03 DIAGNOSIS — Z00.00 ENCOUNTER FOR PREVENTATIVE ADULT HEALTH CARE EXAMINATION: Primary | ICD-10-CM

## 2024-12-03 DIAGNOSIS — R73.09 ELEVATED GLUCOSE LEVEL: ICD-10-CM

## 2024-12-03 PROBLEM — N20.0 KIDNEY STONES: Status: RESOLVED | Noted: 2024-12-03 | Resolved: 2024-12-03

## 2024-12-03 PROBLEM — R10.32 LLQ ABDOMINAL PAIN: Status: ACTIVE | Noted: 2023-07-24

## 2024-12-03 PROCEDURE — 3075F SYST BP GE 130 - 139MM HG: CPT | Performed by: FAMILY MEDICINE

## 2024-12-03 PROCEDURE — 3078F DIAST BP <80 MM HG: CPT | Performed by: FAMILY MEDICINE

## 2024-12-03 PROCEDURE — 90656 IIV3 VACC NO PRSV 0.5 ML IM: CPT | Performed by: FAMILY MEDICINE

## 2024-12-03 PROCEDURE — 90471 IMMUNIZATION ADMIN: CPT | Performed by: FAMILY MEDICINE

## 2024-12-03 PROCEDURE — 99396 PREV VISIT EST AGE 40-64: CPT | Mod: 25 | Performed by: FAMILY MEDICINE

## 2024-12-03 ASSESSMENT — FIBROSIS 4 INDEX: FIB4 SCORE: 0.67

## 2024-12-03 ASSESSMENT — PATIENT HEALTH QUESTIONNAIRE - PHQ9: CLINICAL INTERPRETATION OF PHQ2 SCORE: 0

## 2024-12-03 NOTE — PROGRESS NOTES
Verbal consent was acquired by the patient to use [employer] ambient listening note generation during this visit Yes    Subjective:     CC:   Chief Complaint   Patient presents with    Referral Needed       HPI:   Devin Ramesh is a 39 y.o. male who presents for annual exam    History of Present Illness  The patient is a 39-year-old individual here for a vasectomy referral and annual check-up.    They report no current urination issues but had difficulties for 5 years after forced catheterization in their teens.    They have no dietary restrictions and stay active as a , occasionally jogging and weightlifting on weekends.    They report no substance abuse, practice safety measures, and maintain good oral hygiene.    They had chickenpox in childhood.       Cancer screening  Colorectal Cancer Screening: Due at 45 years  Lung Cancer Screening: Non-smoker   Prostate Cancer Screening/PSA: Due at 55 years old   Lower Urinary Symptoms: None    Health Maintenance  Advanced directive: Due at 65 years old  PT/vit D for falls prevention: Taking a multivitamin   Cholesterol Screening: Due  Diabetes Screenin2023 glucose 126  AAA Screening: Non-smoker   Aspirin Use:  The ASCVD Risk score (Daisy CORSS, et al., 2019) failed to calculate.    Diet: Regular   Exercise: Maintance Tech, very physical, jogging and free weights  Substance Abuse: No concerns    Seat belts, bike helmet, gun safety discussed.  Sun protection used.  Routine Dental: Daily brushing, follows dentist     Infectious disease screening/Immunizations  --HIV Screening: Due  --Hepatitis C Screening: Due  --Immunizations:   Influenza: Due   HPV: Aged out   Tetanus: Due 2032   Shingles: Due at 50 years old   COVID: Due for booster  Pneumococcal : No risk factors due at 65 years old  Hep B series: Due for series   Other immunizations: None    He  has a past medical history of Kidney stone, Kidney stones (2024), and Lumbar  "herniated disc.  He  has no past surgical history on file.    Family History   Problem Relation Age of Onset    Anxiety disorder Mother     Depression Mother     Cancer Father         abdominal    Diabetes Sister         pre    Diabetes Sister         pre    No Known Problems Sister     Diabetes Brother     Autism Brother     Arterial Aneurysm Brother      Social History     Tobacco Use    Smoking status: Never    Smokeless tobacco: Never   Vaping Use    Vaping status: Never Used   Substance Use Topics    Alcohol use: Yes     Comment: once a month    Drug use: No     He  reports being sexually active and has had partner(s) who are female.    Patient Active Problem List    Diagnosis Date Noted    Diverticulosis 04/09/2024    Lymphadenopathy 09/19/2023    Abnormal CT scan 09/19/2023    Mesenteric mass 07/31/2023    Nephrolithiasis 07/31/2023    Elevated glucose level 07/31/2023    LLQ abdominal pain 07/24/2023     Current Outpatient Medications   Medication Sig Dispense Refill    ibuprofen (MOTRIN) 600 MG Tab Take 1 Tablet by mouth every 6 hours as needed for Moderate Pain. 30 Tablet 0    acetaminophen (TYLENOL) 500 MG Tab Take 1-2 Tablets by mouth every 6 hours as needed for Moderate Pain. 30 Tablet 0    coenzyme Q-10 30 MG capsule Take 30 mg by mouth every evening.      multivitamin Tab Take 1 Tablet by mouth every evening.      Glucos-Chondroit-Hyaluron-MSM (GLUCOSAMINE CHONDROITIN JOINT PO) Take 1 Tablet by mouth every evening.      NAPROXEN PO Take 220 mg by mouth every evening.       No current facility-administered medications for this visit.     Allergies   Allergen Reactions    [place] Diarrhea and Vomiting     .       Objective:   /74   Pulse 85   Temp 36.8 °C (98.2 °F)   Ht 1.93 m (6' 4\")   Wt 106 kg (234 lb)   SpO2 96%   BMI 28.48 kg/m²      Wt Readings from Last 4 Encounters:   12/03/24 106 kg (234 lb)   04/09/24 111 kg (245 lb)   09/28/23 109 kg (240 lb 1.3 oz)   09/19/23 108 kg (238 lb) "       Physical Exam  Constitutional:       General: He is not in acute distress.  HENT:      Head: Normocephalic and atraumatic.      Right Ear: Tympanic membrane and external ear normal.      Left Ear: Tympanic membrane and external ear normal.      Nose: No nasal deformity.      Mouth/Throat:      Lips: Pink.      Mouth: Mucous membranes are moist.      Pharynx: Oropharynx is clear. Uvula midline. No posterior oropharyngeal erythema.   Eyes:      General: Lids are normal.      Extraocular Movements: Extraocular movements intact.      Conjunctiva/sclera: Conjunctivae normal.      Pupils: Pupils are equal, round, and reactive to light.   Neck:      Trachea: Trachea normal.   Cardiovascular:      Rate and Rhythm: Normal rate and regular rhythm.      Heart sounds: Normal heart sounds. No murmur heard.     No friction rub. No gallop.   Pulmonary:      Effort: Pulmonary effort is normal. No accessory muscle usage.      Breath sounds: Normal breath sounds. No wheezing or rales.   Abdominal:      General: Bowel sounds are normal.      Palpations: Abdomen is soft.      Tenderness: There is no abdominal tenderness.   Musculoskeletal:      Cervical back: Normal range of motion and neck supple.      Right lower leg: No edema.      Left lower leg: No edema.   Lymphadenopathy:      Cervical: No cervical adenopathy.   Skin:     General: Skin is warm and dry.      Findings: No rash.   Neurological:      General: No focal deficit present.      Mental Status: He is alert and oriented to person, place, and time. Mental status is at baseline.      GCS: GCS eye subscore is 4. GCS verbal subscore is 5. GCS motor subscore is 6.      Motor: No weakness.      Gait: Gait is intact.   Psychiatric:         Attention and Perception: Attention normal.         Mood and Affect: Mood and affect normal.         Speech: Speech normal.         Assessment and Plan:        Assessment & Plan  1. Annual health exam - Due for cholesterol and diabetes  screening  Anticipatory guidance:  --Discussed moderation in sodium/caffeine intake, saturated fat and cholesterol, caloric balance, sufficient fresh fruits/vegetables, and fiber.  --Discussed brushing, flossing, and dental visits.   --Encouraged regular exercise, 150 mins a week of moderate to high intensity cardiovascular activity  --Discussed tobacco, alcohol, or other drug use; availability of treatment for abuse.   --Discussed sexually transmitted infections, partner selection, use of condoms, avoidance of unintended pregnancy and contraceptive alternatives.  --Injury prevention: Discussed safety belts, safety helmets, smoke detector, etc.  -Discussed diet and exercise, colorectal cancer screening, and prostate cancer screening     Health maintenance: Due for HIV screening, hepatitis C screening, influenza, COVID booster, hepatitis B vaccine series  Immunizations per orders  Labs per orders    2. Vasectomy evaluation - Referral to urology placed  - Procedure will be in-office with post-procedure care including ice pack use and monitoring for swelling  - Semen analysis after a certain number of ejaculations to ensure no sperm    Follow-up  - Return in 1 year for annual and labs  Devin was seen today for referral needed.    Diagnoses and all orders for this visit:    Encounter for preventative adult health care examination    Vasectomy evaluation  -     Referral to Urology    Elevated glucose level  -     CBC WITHOUT DIFFERENTIAL; Future  -     Comp Metabolic Panel; Future  -     ESTIMATED GFR; Future  -     HEMOGLOBIN A1C; Future    Encounter for lipid screening for cardiovascular disease  -     Lipid Profile; Future  -     Lipoprotein (a); Future    Thyroid disorder screening  -     TSH+FREE T4    Immunization due  -     INFLUENZA VACCINE TRI INJ (PF)          Follow-up: Return in about 1 year (around 12/3/2025) for Annual.    I have placed vaccination orders.  The MA is preforming vaccination orders under  the direction of Dr. Figueroa    Educated on annual wellness visit information that is covered by their insurance during these exams.  Patient informed that they may receive an additional charge for any acute complaints that are brought up during the annual wellness visit.  Patient acknowledges advisement.    Please note that this dictation was created using voice recognition software. I have made every reasonable attempt to correct obvious errors, but I expect that there are errors of grammar and possibly content that I did not discover before finalizing the note.

## 2024-12-12 NOTE — ED TRIAGE NOTES
Chief Complaint   Patient presents with    Vomiting     Reports vomiting for 12 hrs, reports being around other sick people last week     BP (!) 162/84   Pulse 71   Temp 36.8 °C (98.2 °F) (Temporal)   Resp 16   Wt 105 kg (231 lb 7.7 oz)   SpO2 95%   BMI 28.18 kg/m²     
Alert and oriented, no focal deficits, no motor or sensory deficits.